# Patient Record
Sex: MALE | Race: WHITE | Employment: OTHER | URBAN - METROPOLITAN AREA
[De-identification: names, ages, dates, MRNs, and addresses within clinical notes are randomized per-mention and may not be internally consistent; named-entity substitution may affect disease eponyms.]

---

## 2020-11-10 ENCOUNTER — HOSPITAL ENCOUNTER (EMERGENCY)
Age: 30
Discharge: HOME | End: 2020-11-10
Payer: COMMERCIAL

## 2020-11-10 VITALS
SYSTOLIC BLOOD PRESSURE: 144 MMHG | RESPIRATION RATE: 20 BRPM | WEIGHT: 260 LBS | TEMPERATURE: 98.1 F | HEART RATE: 67 BPM | DIASTOLIC BLOOD PRESSURE: 98 MMHG | BODY MASS INDEX: 34.46 KG/M2 | OXYGEN SATURATION: 98 % | HEIGHT: 73 IN

## 2020-11-10 LAB
A/G RATIO: 1.7 (ref 1.1–2.2)
ALBUMIN SERPL-MCNC: 4.6 G/DL (ref 3.4–5)
ALP BLD-CCNC: 57 U/L (ref 40–129)
ALT SERPL-CCNC: 122 U/L (ref 10–40)
AMMONIA: 79 UMOL/L (ref 16–60)
AMPHETAMINE SCREEN, URINE: POSITIVE
ANION GAP SERPL CALCULATED.3IONS-SCNC: 6 MMOL/L (ref 3–16)
AST SERPL-CCNC: 61 U/L (ref 15–37)
BARBITURATE SCREEN URINE: ABNORMAL
BASOPHILS ABSOLUTE: 0 K/UL (ref 0–0.2)
BASOPHILS RELATIVE PERCENT: 0.5 %
BENZODIAZEPINE SCREEN, URINE: ABNORMAL
BILIRUB SERPL-MCNC: 0.3 MG/DL (ref 0–1)
BILIRUBIN URINE: NEGATIVE
BLOOD, URINE: NEGATIVE
BUN BLDV-MCNC: 11 MG/DL (ref 7–20)
CALCIUM SERPL-MCNC: 9.6 MG/DL (ref 8.3–10.6)
CANNABINOID SCREEN URINE: POSITIVE
CHLORIDE BLD-SCNC: 101 MMOL/L (ref 99–110)
CLARITY: CLEAR
CO2: 28 MMOL/L (ref 21–32)
COCAINE METABOLITE SCREEN URINE: ABNORMAL
COLOR: YELLOW
CREAT SERPL-MCNC: 0.7 MG/DL (ref 0.9–1.3)
EOSINOPHILS ABSOLUTE: 0.3 K/UL (ref 0–0.6)
EOSINOPHILS RELATIVE PERCENT: 4.2 %
GFR AFRICAN AMERICAN: >60
GFR NON-AFRICAN AMERICAN: >60
GLOBULIN: 2.7 G/DL
GLUCOSE BLD-MCNC: 116 MG/DL (ref 70–99)
GLUCOSE URINE: NEGATIVE MG/DL
HCT VFR BLD CALC: 42.9 % (ref 40.5–52.5)
HEMOGLOBIN: 14.5 G/DL (ref 13.5–17.5)
KETONES, URINE: NEGATIVE MG/DL
LEUKOCYTE ESTERASE, URINE: NEGATIVE
LYMPHOCYTES ABSOLUTE: 2.9 K/UL (ref 1–5.1)
LYMPHOCYTES RELATIVE PERCENT: 38.7 %
Lab: ABNORMAL
MCH RBC QN AUTO: 31.2 PG (ref 26–34)
MCHC RBC AUTO-ENTMCNC: 33.8 G/DL (ref 31–36)
MCV RBC AUTO: 92.2 FL (ref 80–100)
METHADONE SCREEN, URINE: ABNORMAL
MICROSCOPIC EXAMINATION: NORMAL
MONOCYTES ABSOLUTE: 0.5 K/UL (ref 0–1.3)
MONOCYTES RELATIVE PERCENT: 7.1 %
NEUTROPHILS ABSOLUTE: 3.7 K/UL (ref 1.7–7.7)
NEUTROPHILS RELATIVE PERCENT: 49.5 %
NITRITE, URINE: NEGATIVE
OPIATE SCREEN URINE: ABNORMAL
OXYCODONE URINE: ABNORMAL
PDW BLD-RTO: 13.3 % (ref 12.4–15.4)
PH UA: 6
PH UA: 6 (ref 5–8)
PHENCYCLIDINE SCREEN URINE: ABNORMAL
PLATELET # BLD: 203 K/UL (ref 135–450)
PMV BLD AUTO: 9.3 FL (ref 5–10.5)
POTASSIUM REFLEX MAGNESIUM: 3.7 MMOL/L (ref 3.5–5.1)
PROPOXYPHENE SCREEN: ABNORMAL
PROTEIN UA: NEGATIVE MG/DL
RBC # BLD: 4.66 M/UL (ref 4.2–5.9)
SODIUM BLD-SCNC: 135 MMOL/L (ref 136–145)
SPECIFIC GRAVITY UA: 1.01 (ref 1–1.03)
TOTAL PROTEIN: 7.3 G/DL (ref 6.4–8.2)
URINE TYPE: NORMAL
UROBILINOGEN, URINE: 0.2 E.U./DL
WBC # BLD: 7.4 K/UL (ref 4–11)

## 2020-11-10 PROCEDURE — 99284 EMERGENCY DEPT VISIT MOD MDM: CPT

## 2020-11-10 PROCEDURE — 85025 COMPLETE CBC W/AUTO DIFF WBC: CPT

## 2020-11-10 PROCEDURE — 81003 URINALYSIS AUTO W/O SCOPE: CPT

## 2020-11-10 PROCEDURE — 6370000000 HC RX 637 (ALT 250 FOR IP): Performed by: NURSE PRACTITIONER

## 2020-11-10 PROCEDURE — 80307 DRUG TEST PRSMV CHEM ANLYZR: CPT

## 2020-11-10 PROCEDURE — 82140 ASSAY OF AMMONIA: CPT

## 2020-11-10 PROCEDURE — 80053 COMPREHEN METABOLIC PANEL: CPT

## 2020-11-10 RX ORDER — LACTULOSE 10 G/15ML
20 SOLUTION ORAL ONCE
Status: COMPLETED | OUTPATIENT
Start: 2020-11-10 | End: 2020-11-10

## 2020-11-10 RX ORDER — LACTULOSE 10 G/15ML
20 SOLUTION ORAL EVERY EVENING
Qty: 900 ML | Refills: 0 | Status: SHIPPED | OUTPATIENT
Start: 2020-11-10 | End: 2020-12-10

## 2020-11-10 RX ADMIN — RIFAXIMIN 550 MG: 550 TABLET ORAL at 21:29

## 2020-11-10 RX ADMIN — LACTULOSE 20 G: 20 SOLUTION ORAL at 21:29

## 2020-11-11 NOTE — ED NOTES
Discharge instructions were reviewed with the patient and the patient verbalized understanding. Patient IV was removed with no complications. Patient stable and ambulatory upon discharge.        Annemarie Joy RN  11/10/20 1583
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
## 2020-11-13 ENCOUNTER — TELEPHONE (OUTPATIENT)
Dept: GASTROENTEROLOGY | Age: 30
End: 2020-11-13

## 2020-11-13 NOTE — TELEPHONE ENCOUNTER
----- Message from Vidal Bermeo MD sent at 11/11/2020 12:43 PM EST -----  Offer apt.   Referred by ER.  ----- Message -----  From: Automatic Discharge Provider  Sent: 11/10/2020   9:35 PM EST  To: Vidal Bermeo MD

## 2020-11-16 NOTE — TELEPHONE ENCOUNTER
Scheduled 11/23 You can access the FollowMyHealth Patient Portal offered by [facility] by registering at the following website: [email or web address]. By joining [facility]’s FollowMyHealth portal, you will also be able to view your health information using other applications (apps) compatible with our system.

## 2020-11-17 ENCOUNTER — INITIAL CONSULT (OUTPATIENT)
Dept: GASTROENTEROLOGY | Age: 30
End: 2020-11-17
Payer: COMMERCIAL

## 2020-11-17 ENCOUNTER — HOSPITAL ENCOUNTER (OUTPATIENT)
Age: 30
Discharge: HOME | End: 2020-11-17
Payer: COMMERCIAL

## 2020-11-17 VITALS
TEMPERATURE: 97 F | BODY MASS INDEX: 33 KG/M2 | HEIGHT: 73 IN | WEIGHT: 249 LBS | SYSTOLIC BLOOD PRESSURE: 122 MMHG | DIASTOLIC BLOOD PRESSURE: 88 MMHG

## 2020-11-17 PROCEDURE — 84450 TRANSFERASE (AST) (SGOT): CPT

## 2020-11-17 PROCEDURE — 86702 HIV-2 ANTIBODY: CPT

## 2020-11-17 PROCEDURE — G8427 DOCREV CUR MEDS BY ELIG CLIN: HCPCS | Performed by: INTERNAL MEDICINE

## 2020-11-17 PROCEDURE — 87902 NFCT AGT GNTYP ALYS HEP C: CPT

## 2020-11-17 PROCEDURE — 87522 HEPATITIS C REVRS TRNSCRPJ: CPT

## 2020-11-17 PROCEDURE — G8419 CALC BMI OUT NRM PARAM NOF/U: HCPCS | Performed by: INTERNAL MEDICINE

## 2020-11-17 PROCEDURE — 83883 ASSAY NEPHELOMETRY NOT SPEC: CPT

## 2020-11-17 PROCEDURE — 84520 ASSAY OF UREA NITROGEN: CPT

## 2020-11-17 PROCEDURE — 86701 HIV-1ANTIBODY: CPT

## 2020-11-17 PROCEDURE — 4004F PT TOBACCO SCREEN RCVD TLK: CPT | Performed by: INTERNAL MEDICINE

## 2020-11-17 PROCEDURE — 87390 HIV-1 AG IA: CPT

## 2020-11-17 PROCEDURE — 82977 ASSAY OF GGT: CPT

## 2020-11-17 PROCEDURE — 80074 ACUTE HEPATITIS PANEL: CPT

## 2020-11-17 PROCEDURE — 36415 COLL VENOUS BLD VENIPUNCTURE: CPT

## 2020-11-17 PROCEDURE — 99204 OFFICE O/P NEW MOD 45 MIN: CPT | Performed by: INTERNAL MEDICINE

## 2020-11-17 PROCEDURE — 84460 ALANINE AMINO (ALT) (SGPT): CPT

## 2020-11-17 PROCEDURE — G8484 FLU IMMUNIZE NO ADMIN: HCPCS | Performed by: INTERNAL MEDICINE

## 2020-11-17 NOTE — PATIENT INSTRUCTIONS
[address] ,  [address] [facility]  Phone: [phone]   FZT:[phone]  [address] [place] [address],  [facility] [address]  [place], [address] [place] [address]  Phone: [phone]    What is FibroScan® ? Examination with FibroScan®, also called transient elastography, is a technique used to assess liver stiffness (measured in kPa correlated to fibrosis) without invasive investigation. The result is immediate, it shows the conditionof the liver and allows physicians to diagnoseand monitor disease evolution in conjunction with treatment and collateral factors. Exam results help to anticipate various complications, as well as to monitor and assess the damage caused by conditions such as cirrhosis. The FibroScan® examination is painless, quick and easy. During measurement, you feel a slight vibration on the skin at the tip of the probe. What does the FibroScan® examination consist of? You lie on your back, with your right arm raised behind your head. Physician applies a water-based gel to the skin and places the probe with a slight pressure  The examination includes 10 consecutive measurements made at the same location  The result is delivered at the end of the examination, it's a number which can vary from 1.5 to 75 kPa. Your doctor will interpret the result      What does the result mean? Your physician interprets the result according to your history and underlying disease. Who can prescribe the FibroScan® examination? Your physician or hepatologist will indicate the most appropriate time for you to have the examination. What difference does FibroScan® make to me?     Fibroscan® provides immediate results, it's easy and fast (5-10 minutes)  The exam is painless and non-invasive  In case of close follow-up, the examination can be safely repeated

## 2020-11-17 NOTE — PROGRESS NOTES
(This list may include medications prescribed during this encounter as epic can not insert only the list prior to this encounter.)  Current Outpatient Rx   Medication Sig Dispense Refill    lactulose (CHRONULAC) 10 GM/15ML solution Take 30 mLs by mouth every evening 900 mL 0     ALLERGIES   No Known Allergies  IMMUNIZATIONS     There is no immunization history on file for this patient. REVIEW OF SYSTEMS (2-9 systems for level 4, 10 or more for level 5)   See HPI for further details and pertinent postiives. Negative for the following:  Constitutional: Negative for weight change. Negative for appetite change and fatigue. HENT: Negative for nosebleeds, sore throat, mouth sores, and voice change. Respiratory: Negative for cough, choking and chest tightness. Cardiovascular: Negative for chest pain   Gastrointestinal: No abdominal pain, heartburn, bloating, dysphagia, cough, chest pain, globus, regurgitation, diarrhea, constipation, nausea, or vomiting. Positive for hallucinations. Musculoskeletal: Negative for arthralgias. Skin: Negative for pallor. Neurological: Negative for weakness and light-headedness. Hematological: Negative for adenopathy. Does not bruise/bleed easily. Psychiatric/Behavioral: Negative for suicidal ideas. PHYSICAL EXAM   VITAL SIGNS: /88   Temp 97 °F (36.1 °C)   Ht 6' 1\" (1.854 m)   Wt 249 lb (112.9 kg)   BMI 32.85 kg/m²   Wt Readings from Last 3 Encounters:   11/17/20 249 lb (112.9 kg)   11/10/20 260 lb (117.9 kg)     Constitutional: Well developed, Well nourished, No acute distress, Non-toxic appearance. HENT: Normocephalic, Atraumatic, Bilateral external ears normal, Oropharynx moist, No oral exudates, Nose normal.   Eyes: Conjunctiva normal, No discharge. Neck: Normal range of motion, No tenderness, Supple, No stridor. Lymphatic: No cervical, subclavian, or axillary lymphadenopathy.   Cardiovascular: Normal heart rate, Normal rhythm, No murmurs, No rubs, No gallops. Thorax & Lungs: Normal breath sounds, No respiratory distress, No wheezing, No chest tenderness. No gynecomastia. Abdomen/GI: scars consistent with stated surgeries, no hernias, no HSM, soft NTND   Rectal:  Deferred. Skin: Warm, Dry, No erythema, No rash. No bruising. No spider hemangiomas. Back: No tenderness, No CVA tenderness. Lower Extremities: Intact distal pulses, No edema, No tenderness, No cyanosis, No clubbing. Neurologic: Alert & oriented x 3, Normal motor function, Normal sensory function, No focal deficits noted. No asterixis. RADIOLOGY/PROCEDURES       FINAL IMPRESSION     Orders Placed This Encounter   Procedures    US GALLBLADDER RUQ     Standing Status:   Future     Standing Expiration Date:   11/17/2021     Order Specific Question:   Reason for exam:     Answer:   elevated lft    HIV Screen     Standing Status:   Future     Number of Occurrences:   1     Standing Expiration Date:   11/17/2021    Hepatitis C Genotype     Standing Status:   Future     Number of Occurrences:   1     Standing Expiration Date:   11/17/2021    Hepatitis C RNA, quantitative, PCR     Standing Status:   Future     Number of Occurrences:   1     Standing Expiration Date:   12/17/2020    Liver Fibrosis, Chronic Viral Hepatitis     Standing Status:   Future     Number of Occurrences:   1     Standing Expiration Date:   11/17/2021    Hepatitis Panel, Acute     Standing Status:   Future     Number of Occurrences:   1     Standing Expiration Date:   12/17/2020    Misc nursing order (specify)     Standing Status:   Future     Standing Expiration Date:   12/17/2020     Scheduling Instructions:      Schedule Fibroscan test through [facility] endoscopy. Ana Elgin was seen today for new patient. Diagnoses and all orders for this visit:    Hepatitis C virus infection without hepatic coma, unspecified chronicity  -     HIV Screen; Future  -     Hepatitis C Genotype;  Future  -     Hepatitis C RNA, quantitative, PCR; Future  -     Liver Fibrosis, Chronic Viral Hepatitis; Future  -     Hepatitis Panel, Acute; Future  -     US GALLBLADDER RUQ; Future  -     Misc nursing order (specify); Future    Elevated LFTs  -     Hepatitis Panel, Acute; Future  -     US GALLBLADDER RUQ; Future      ORDERED FUTURE/PENDING TESTS       FOLLOWUP   Return 1 month into treatment, for after ordered tests. Hank Webster 11/17/20 2:52 PM EST    CC:  No primary care provider on file.

## 2020-11-18 ENCOUNTER — HOSPITAL ENCOUNTER (OUTPATIENT)
Dept: ULTRASOUND IMAGING | Age: 30
Discharge: HOME | End: 2020-11-18
Payer: COMMERCIAL

## 2020-11-18 ENCOUNTER — HOSPITAL ENCOUNTER (OUTPATIENT)
Dept: ENDOSCOPY | Age: 30
Setting detail: OUTPATIENT SURGERY
Discharge: HOME | End: 2020-11-18
Payer: COMMERCIAL

## 2020-11-18 LAB
HAV IGM SER IA-ACNC: ABNORMAL
HEPATITIS B CORE IGM ANTIBODY: ABNORMAL
HEPATITIS B SURFACE ANTIGEN INTERPRETATION: ABNORMAL
HEPATITIS C ANTIBODY INTERPRETATION: REACTIVE
HIV AG/AB: NORMAL
HIV ANTIGEN: NORMAL
HIV-1 ANTIBODY: NORMAL
HIV-2 AB: NORMAL

## 2020-11-18 PROCEDURE — 76705 ECHO EXAM OF ABDOMEN: CPT

## 2020-11-19 ENCOUNTER — HOSPITAL ENCOUNTER (OUTPATIENT)
Dept: ENDOSCOPY | Age: 30
Setting detail: OUTPATIENT SURGERY
Discharge: HOME | End: 2020-11-19
Payer: COMMERCIAL

## 2020-11-19 LAB
HCV QNT BY NAAT IU/ML: ABNORMAL
HCV QNT BY NAAT LOG IU/ML: 6.46 LOG IU/ML
INTERPRETATION: DETECTED

## 2020-11-19 PROCEDURE — 91200 LIVER ELASTOGRAPHY: CPT

## 2020-11-19 PROCEDURE — 91200 LIVER ELASTOGRAPHY: CPT | Performed by: INTERNAL MEDICINE

## 2020-11-23 LAB
HEPATITIS C GENOTYPE: NORMAL
MISCELLANEOUS LAB TEST ORDER: ABNORMAL

## 2020-12-07 ENCOUNTER — TELEPHONE (OUTPATIENT)
Dept: GASTROENTEROLOGY | Age: 30
End: 2020-12-07

## 2020-12-07 NOTE — TELEPHONE ENCOUNTER
Called pt to let him know insurance approved Wali Yolanda 12/01/2020-02/02/2021. He will be getting a call from [address] [place] [address] about meds.

## 2020-12-08 ENCOUNTER — TELEPHONE (OUTPATIENT)
Dept: GASTROENTEROLOGY | Age: 30
End: 2020-12-08

## 2021-01-07 ENCOUNTER — INITIAL CONSULT (OUTPATIENT)
Dept: GASTROENTEROLOGY | Age: 31
End: 2021-01-07
Payer: COMMERCIAL

## 2021-01-07 ENCOUNTER — HOSPITAL ENCOUNTER (OUTPATIENT)
Age: 31
Discharge: HOME | End: 2021-01-07
Payer: COMMERCIAL

## 2021-01-07 VITALS
TEMPERATURE: 96.9 F | BODY MASS INDEX: 33.53 KG/M2 | WEIGHT: 253 LBS | DIASTOLIC BLOOD PRESSURE: 120 MMHG | HEART RATE: 77 BPM | HEIGHT: 73 IN | SYSTOLIC BLOOD PRESSURE: 179 MMHG

## 2021-01-07 DIAGNOSIS — B19.20 HEPATITIS C VIRUS INFECTION WITHOUT HEPATIC COMA, UNSPECIFIED CHRONICITY: ICD-10-CM

## 2021-01-07 DIAGNOSIS — R79.89 ELEVATED LFTS: Primary | ICD-10-CM

## 2021-01-07 DIAGNOSIS — R79.89 ELEVATED LFTS: ICD-10-CM

## 2021-01-07 LAB
ALBUMIN SERPL-MCNC: 4.5 G/DL (ref 3.4–5)
ALP BLD-CCNC: 67 U/L (ref 40–129)
ALT SERPL-CCNC: 28 U/L (ref 10–40)
AST SERPL-CCNC: 28 U/L (ref 15–37)
BILIRUB SERPL-MCNC: <0.2 MG/DL (ref 0–1)
BILIRUBIN DIRECT: <0.2 MG/DL (ref 0–0.3)
BILIRUBIN, INDIRECT: NORMAL MG/DL (ref 0–1)
TOTAL PROTEIN: 7.4 G/DL (ref 6.4–8.2)

## 2021-01-07 PROCEDURE — G8484 FLU IMMUNIZE NO ADMIN: HCPCS | Performed by: INTERNAL MEDICINE

## 2021-01-07 PROCEDURE — 99213 OFFICE O/P EST LOW 20 MIN: CPT | Performed by: INTERNAL MEDICINE

## 2021-01-07 PROCEDURE — 4004F PT TOBACCO SCREEN RCVD TLK: CPT | Performed by: INTERNAL MEDICINE

## 2021-01-07 PROCEDURE — 36415 COLL VENOUS BLD VENIPUNCTURE: CPT

## 2021-01-07 PROCEDURE — G8417 CALC BMI ABV UP PARAM F/U: HCPCS | Performed by: INTERNAL MEDICINE

## 2021-01-07 PROCEDURE — G8427 DOCREV CUR MEDS BY ELIG CLIN: HCPCS | Performed by: INTERNAL MEDICINE

## 2021-01-07 PROCEDURE — 80076 HEPATIC FUNCTION PANEL: CPT

## 2021-01-07 RX ORDER — LACTULOSE 10 G/15ML
1 SOLUTION ORAL DAILY
COMMUNITY
End: 2022-07-06

## 2021-01-07 RX ORDER — GLECAPREVIR AND PIBRENTASVIR 40; 100 MG/1; MG/1
1 TABLET, FILM COATED ORAL DAILY
COMMUNITY
Start: 2021-01-05 | End: 2022-07-06

## 2021-01-07 NOTE — PROGRESS NOTES
[address] [facility],  [address] [place] [address]  Phone: [phone]   QND:[phone]    CHIEF COMPLAINT     Chief Complaint   Patient presents with    New Patient     elevated liver enzymes       HPI (location/symptom, timing/onset, duration, quality/severity, context, modifying factors, and associated signs/symptoms)     He is a  [2] White [1] 26 y.o. . male seen with his wife who presents with the following GI complaints:    Leonid Falk is her for follow up of chcv. He has been on mavyret for the past month without side effects. Pretreatment transaminases were elevated. Fibroscan showed F0-1 fibrosis. He continues to have hallucinations. Has not seen neurology or psychiatry. Last Encounter Reviewed: 11/17/2020  Leonid Falk  Has a h/o substance abuse. He was recently in the ER 4 days ago for hallucinations of 7 months duration surrounding his wife. He had been clean 4 year and had recently relapsed once. In the ER transaminases were elevated as was ammonia. Transaminases were normal in 2015. CBC and plt count were normal.  No imaging performed without any abdominal complaints. He was told he had hcv years ago but has never pursued treatment. There is a family history of cirrhosis and alcoholism. He denies drinking.              Lab Results   Component Value Date      (H) 11/10/2020     AST 61 (H) 11/10/2020     ALKPHOS 57 11/10/2020     BILITOT 0.3 11/10/2020            Lab Results   Component Value Date     WBC 7.4 11/10/2020     HGB 14.5 11/10/2020     HCT 42.9 11/10/2020     MCV 92.2 11/10/2020      11/10/2020         Last Encounter Reviewed:   Pertinent PMH, FH, SH is reviewed below. Last EGD: none  Last Colonoscopy: none    Review of available records reveals:   Wt Readings from Last 50 Encounters:   01/07/21 253 lb (114.8 kg)   11/17/20 249 lb (112.9 kg)   11/10/20 260 lb (117.9 kg)       No components found for: HGBA1C BP Readings from Last 3 Encounters:   01/07/21 (!) 179/120   11/17/20 122/88   11/10/20 (!) 144/98     Health Maintenance   Topic Date Due    Hepatitis A vaccine (1 of 2 - Risk 2-dose series) 04/22/1989    Varicella vaccine (1 of 2 - 2-dose childhood series) 04/22/1989    Pneumococcal 0-64 years Vaccine (1 of 1 - PPSV23) 04/22/1994    Hepatitis B vaccine (2 of 3 - Risk 3-dose series) 04/19/2001    Flu vaccine (1) 09/01/2020    DTaP/Tdap/Td vaccine (5 - Td) 04/20/2027    HIV screen  Completed    Hib vaccine  Aged Out    Meningococcal (ACWY) vaccine  Aged Out       No components found for: [facility]     PAST MEDICAL HISTORY     Past Medical History:   Diagnosis Date    Hepatitis C 11/17/2020    [facility] abuse ([facility] [address]     Methamphetamine abuse ([facility] [address]      FAMILY HISTORY   No family history on file.   SOCIAL HISTORY     Social History     Socioeconomic History    Marital status:      Spouse name: Not on file    Number of children: Not on file    Years of education: Not on file    Highest education level: Not on file   Occupational History    Not on file   Social Needs    Financial resource strain: Not on file    Food insecurity     Worry: Not on file     Inability: Not on file    Transportation needs     Medical: Not on file     Non-medical: Not on file   Tobacco Use    Smoking status: Current Every Day Smoker     Packs/day: 1.00     Types: Cigarettes    Smokeless tobacco: Current User   Substance and Sexual Activity    Alcohol use: No    Drug use: Yes     Types: IV, Methamphetamines     Comment: meth last used 11/09/2020    Sexual activity: Not on file   Lifestyle    Physical activity     Days per week: Not on file     Minutes per session: Not on file    Stress: Not on file   Relationships    Social connections     Talks on phone: Not on file     Gets together: Not on file     Attends Anglican service: Not on file     Active member of club or organization: Not on file VITAL SIGNS: BP (!) 179/120 (Site: Left Wrist, Position: Sitting, Cuff Size: Medium Adult)   Pulse 77   Temp 96.9 °F (36.1 °C) (Temporal)   Ht 6' 1\" (1.854 m)   Wt 253 lb (114.8 kg)   BMI 33.38 kg/m²   Wt Readings from Last 3 Encounters:   01/07/21 253 lb (114.8 kg)   11/17/20 249 lb (112.9 kg)   11/10/20 260 lb (117.9 kg)     Constitutional: Well developed, Well nourished, No acute distress, Non-toxic appearance. HENT: Normocephalic, Atraumatic, Bilateral external ears normal, Oropharynx moist, No oral exudates, Nose normal.   Eyes: Conjunctiva normal, No discharge. Neck: Normal range of motion, No tenderness, Supple, No stridor. Lymphatic: No cervical, subclavian, or axillary lymphadenopathy. Cardiovascular: Normal heart rate, Normal rhythm, No murmurs, No rubs, No gallops. Thorax & Lungs: Normal breath sounds, No respiratory distress, No wheezing, No chest tenderness. No gynecomastia. Abdomen/GI: scars consistent with stated surgeries, no hernias, no HSM, soft NTND   Rectal:  Deferred. Skin: Warm, Dry, No erythema, No rash. No bruising. No spider hemangiomas. Back: No tenderness, No CVA tenderness. Lower Extremities: Intact distal pulses, No edema, No tenderness, No cyanosis, No clubbing. Neurologic: Alert & oriented x 3, Normal motor function, Normal sensory function, No focal deficits noted. No asterixis. RADIOLOGY/PROCEDURES         FINAL IMPRESSION     Orders Placed This Encounter   Procedures    Hepatic Function Panel     Standing Status:   Future     Number of Occurrences:   1     Standing Expiration Date:   2/7/2021     Flor Vidales was seen today for new patient. Diagnoses and all orders for this visit:    Elevated LFTs  -     Hepatic Function Panel; Future    Hepatitis C virus infection without hepatic coma, unspecified chronicity  -     Hepatic Function Panel;  Future Will order viral load at completion of treatment and 3 months later for SVR. Recommended calling pcp for referral to psychiatry and neurology for hallucinations. Does not need to see us back unless hcv treatment is unsuccessful. ORDERED FUTURE/PENDING TESTS     Lab Frequency Next Occurrence       FOLLOWUP   Return for after ordered tests. Jean Carlos Herrera 1/7/21 3:11 PM EST    CC:  No primary care provider on file.

## 2021-03-04 ENCOUNTER — TELEPHONE (OUTPATIENT)
Dept: GASTROENTEROLOGY | Age: 31
End: 2021-03-04

## 2021-03-04 DIAGNOSIS — B19.20 HEPATITIS C VIRUS INFECTION WITHOUT HEPATIC COMA, UNSPECIFIED CHRONICITY: Primary | ICD-10-CM

## 2021-03-04 NOTE — TELEPHONE ENCOUNTER
Pt was seeing Dr Jaleesa Arellano. Pt has finished his mavyret. Is there labs, us, or anything he needs before appointment? Pt scheduled 3/16/21.  Please advise

## 2021-06-02 ENCOUNTER — HOSPITAL ENCOUNTER (EMERGENCY)
Age: 31
Discharge: HOME | End: 2021-06-02
Payer: COMMERCIAL

## 2021-06-02 ENCOUNTER — HOSPITAL ENCOUNTER (OUTPATIENT)
Age: 31
Discharge: HOME | End: 2021-06-02
Payer: COMMERCIAL

## 2021-06-02 VITALS
OXYGEN SATURATION: 98 % | TEMPERATURE: 97.5 F | HEIGHT: 73 IN | RESPIRATION RATE: 16 BRPM | DIASTOLIC BLOOD PRESSURE: 90 MMHG | SYSTOLIC BLOOD PRESSURE: 156 MMHG | HEART RATE: 90 BPM | BODY MASS INDEX: 33.13 KG/M2 | WEIGHT: 250 LBS

## 2021-06-02 DIAGNOSIS — R03.0 ELEVATED BLOOD PRESSURE READING: ICD-10-CM

## 2021-06-02 DIAGNOSIS — H92.02 ACUTE OTALGIA, LEFT: Primary | ICD-10-CM

## 2021-06-02 DIAGNOSIS — K08.89 ODONTALGIA: ICD-10-CM

## 2021-06-02 DIAGNOSIS — N30.01 ACUTE CYSTITIS WITH HEMATURIA: ICD-10-CM

## 2021-06-02 DIAGNOSIS — H65.192 ACUTE MEE (MIDDLE EAR EFFUSION), LEFT: ICD-10-CM

## 2021-06-02 DIAGNOSIS — B19.20 HEPATITIS C VIRUS INFECTION WITHOUT HEPATIC COMA, UNSPECIFIED CHRONICITY: ICD-10-CM

## 2021-06-02 LAB
BACTERIA: ABNORMAL /HPF
BILIRUBIN URINE: NEGATIVE
BLOOD, URINE: ABNORMAL
CLARITY: CLEAR
COLOR: YELLOW
EPITHELIAL CELLS, UA: ABNORMAL /HPF (ref 0–5)
GLUCOSE URINE: NEGATIVE MG/DL
KETONES, URINE: NEGATIVE MG/DL
LEUKOCYTE ESTERASE, URINE: ABNORMAL
MICROSCOPIC EXAMINATION: YES
NITRITE, URINE: NEGATIVE
PH UA: 7 (ref 5–8)
PROTEIN UA: NEGATIVE MG/DL
RBC UA: ABNORMAL /HPF (ref 0–4)
SPECIFIC GRAVITY UA: 1.02 (ref 1–1.03)
URINE REFLEX TO CULTURE: ABNORMAL
URINE TYPE: ABNORMAL
UROBILINOGEN, URINE: 0.2 E.U./DL
WBC UA: ABNORMAL /HPF (ref 0–5)

## 2021-06-02 PROCEDURE — 6370000000 HC RX 637 (ALT 250 FOR IP): Performed by: PHYSICIAN ASSISTANT

## 2021-06-02 PROCEDURE — 6360000002 HC RX W HCPCS: Performed by: PHYSICIAN ASSISTANT

## 2021-06-02 PROCEDURE — 81001 URINALYSIS AUTO W/SCOPE: CPT

## 2021-06-02 PROCEDURE — 87522 HEPATITIS C REVRS TRNSCRPJ: CPT

## 2021-06-02 PROCEDURE — 87086 URINE CULTURE/COLONY COUNT: CPT

## 2021-06-02 PROCEDURE — 87491 CHLMYD TRACH DNA AMP PROBE: CPT

## 2021-06-02 PROCEDURE — 87591 N.GONORRHOEAE DNA AMP PROB: CPT

## 2021-06-02 PROCEDURE — 2500000003 HC RX 250 WO HCPCS: Performed by: PHYSICIAN ASSISTANT

## 2021-06-02 PROCEDURE — 99285 EMERGENCY DEPT VISIT HI MDM: CPT

## 2021-06-02 PROCEDURE — 87186 SC STD MICRODIL/AGAR DIL: CPT

## 2021-06-02 PROCEDURE — 87088 URINE BACTERIA CULTURE: CPT

## 2021-06-02 PROCEDURE — 96372 THER/PROPH/DIAG INJ SC/IM: CPT

## 2021-06-02 RX ORDER — CEFUROXIME AXETIL 500 MG/1
500 TABLET ORAL 2 TIMES DAILY
Qty: 14 TABLET | Refills: 0 | Status: SHIPPED | OUTPATIENT
Start: 2021-06-02 | End: 2021-06-09

## 2021-06-02 RX ORDER — AZITHROMYCIN 250 MG/1
1000 TABLET, FILM COATED ORAL ONCE
Status: COMPLETED | OUTPATIENT
Start: 2021-06-02 | End: 2021-06-02

## 2021-06-02 RX ADMIN — AZITHROMYCIN 1000 MG: 250 TABLET, FILM COATED ORAL at 22:23

## 2021-06-02 RX ADMIN — CEFTRIAXONE SODIUM 1000 MG: 1 INJECTION, POWDER, FOR SOLUTION INTRAMUSCULAR; INTRAVENOUS at 22:24

## 2021-06-02 ASSESSMENT — PAIN SCALES - GENERAL: PAINLEVEL_OUTOF10: 4

## 2021-06-02 ASSESSMENT — PAIN DESCRIPTION - LOCATION: LOCATION: EAR;BACK

## 2021-06-03 LAB
C. TRACHOMATIS DNA ,URINE: NEGATIVE
N. GONORRHOEAE DNA, URINE: NEGATIVE

## 2021-06-03 ASSESSMENT — ENCOUNTER SYMPTOMS
VOMITING: 0
SHORTNESS OF BREATH: 0
COUGH: 0
FACIAL SWELLING: 0
ABDOMINAL PAIN: 0
BACK PAIN: 1
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
## 2021-06-03 NOTE — ED NOTES
..Patient discharged to home, alert, oriented, skin warm, dry and pink. Denies needs and or questions.  Will follow up as directed, patient encouraged to return for worsening or new symptoms or other concerns       Lisa Copeland RN  06/02/21 0001

## 2021-06-04 LAB
ORGANISM: ABNORMAL
URINE CULTURE, ROUTINE: ABNORMAL

## 2021-06-05 LAB
HCV QNT BY NAAT IU/ML: NOT DETECTED IU/ML
HCV QNT BY NAAT LOG IU/ML: NOT DETECTED LOG IU/ML
INTERPRETATION: NOT DETECTED

## 2022-07-06 ENCOUNTER — HOSPITAL ENCOUNTER (EMERGENCY)
Age: 32
Discharge: HOME | End: 2022-07-07
Payer: COMMERCIAL

## 2022-07-06 VITALS
BODY MASS INDEX: 33.13 KG/M2 | WEIGHT: 250 LBS | HEIGHT: 73 IN | RESPIRATION RATE: 16 BRPM | SYSTOLIC BLOOD PRESSURE: 156 MMHG | HEART RATE: 89 BPM | TEMPERATURE: 97.6 F | OXYGEN SATURATION: 94 % | DIASTOLIC BLOOD PRESSURE: 112 MMHG

## 2022-07-06 DIAGNOSIS — N34.2 URETHRITIS: Primary | ICD-10-CM

## 2022-07-06 LAB
BILIRUBIN URINE: NEGATIVE
BLOOD, URINE: NEGATIVE
CLARITY: CLEAR
COLOR: YELLOW
GLUCOSE URINE: NEGATIVE MG/DL
KETONES, URINE: NEGATIVE MG/DL
LEUKOCYTE ESTERASE, URINE: NEGATIVE
MICROSCOPIC EXAMINATION: NORMAL
NITRITE, URINE: NEGATIVE
PH UA: 6.5 (ref 5–8)
PROTEIN UA: NEGATIVE MG/DL
SPECIFIC GRAVITY UA: 1.02 (ref 1–1.03)
URINE TYPE: NORMAL
UROBILINOGEN, URINE: 0.2 E.U./DL

## 2022-07-06 PROCEDURE — 87491 CHLMYD TRACH DNA AMP PROBE: CPT

## 2022-07-06 PROCEDURE — 81003 URINALYSIS AUTO W/O SCOPE: CPT

## 2022-07-06 PROCEDURE — 87591 N.GONORRHOEAE DNA AMP PROB: CPT

## 2022-07-06 PROCEDURE — 96372 THER/PROPH/DIAG INJ SC/IM: CPT

## 2022-07-06 PROCEDURE — 99284 EMERGENCY DEPT VISIT MOD MDM: CPT

## 2022-07-06 RX ORDER — DOXYCYCLINE HYCLATE 100 MG
100 TABLET ORAL ONCE
Status: COMPLETED | OUTPATIENT
Start: 2022-07-07 | End: 2022-07-07

## 2022-07-06 ASSESSMENT — PAIN - FUNCTIONAL ASSESSMENT: PAIN_FUNCTIONAL_ASSESSMENT: NONE - DENIES PAIN

## 2022-07-07 PROCEDURE — 6370000000 HC RX 637 (ALT 250 FOR IP): Performed by: NURSE PRACTITIONER

## 2022-07-07 PROCEDURE — 6360000002 HC RX W HCPCS: Performed by: NURSE PRACTITIONER

## 2022-07-07 PROCEDURE — 2500000003 HC RX 250 WO HCPCS: Performed by: NURSE PRACTITIONER

## 2022-07-07 RX ORDER — DOXYCYCLINE HYCLATE 100 MG
100 TABLET ORAL 2 TIMES DAILY
Qty: 14 TABLET | Refills: 0 | Status: SHIPPED | OUTPATIENT
Start: 2022-07-07 | End: 2022-07-14

## 2022-07-07 RX ADMIN — DOXYCYCLINE HYCLATE 100 MG: 100 TABLET, COATED ORAL at 00:08

## 2022-07-07 RX ADMIN — LIDOCAINE HYDROCHLORIDE 500 MG: 10 INJECTION, SOLUTION EPIDURAL; INFILTRATION; INTRACAUDAL; PERINEURAL at 00:08

## 2022-07-08 LAB
C. TRACHOMATIS DNA ,URINE: POSITIVE
N. GONORRHOEAE DNA, URINE: NEGATIVE
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
## 2022-12-13 ENCOUNTER — APPOINTMENT (OUTPATIENT)
Dept: GENERAL RADIOLOGY | Age: 32
End: 2022-12-13
Payer: COMMERCIAL

## 2022-12-13 ENCOUNTER — HOSPITAL ENCOUNTER (EMERGENCY)
Age: 32
Discharge: HOME | End: 2022-12-13
Payer: COMMERCIAL

## 2022-12-13 VITALS
DIASTOLIC BLOOD PRESSURE: 88 MMHG | HEART RATE: 80 BPM | SYSTOLIC BLOOD PRESSURE: 160 MMHG | TEMPERATURE: 98.3 F | RESPIRATION RATE: 20 BRPM | OXYGEN SATURATION: 99 %

## 2022-12-13 DIAGNOSIS — S80.811A ABRASION OF RIGHT LOWER EXTREMITY, INITIAL ENCOUNTER: ICD-10-CM
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
## 2022-12-13 DIAGNOSIS — S39.012A LUMBAR STRAIN, INITIAL ENCOUNTER: ICD-10-CM

## 2022-12-13 DIAGNOSIS — M25.522 LEFT ELBOW PAIN: ICD-10-CM

## 2022-12-13 PROCEDURE — 6370000000 HC RX 637 (ALT 250 FOR IP): Performed by: PHYSICIAN ASSISTANT

## 2022-12-13 PROCEDURE — 73080 X-RAY EXAM OF ELBOW: CPT

## 2022-12-13 PROCEDURE — 99284 EMERGENCY DEPT VISIT MOD MDM: CPT

## 2022-12-13 PROCEDURE — 6360000002 HC RX W HCPCS: Performed by: PHYSICIAN ASSISTANT

## 2022-12-13 PROCEDURE — 72100 X-RAY EXAM L-S SPINE 2/3 VWS: CPT

## 2022-12-13 PROCEDURE — 90471 IMMUNIZATION ADMIN: CPT | Performed by: PHYSICIAN ASSISTANT

## 2022-12-13 PROCEDURE — 90715 TDAP VACCINE 7 YRS/> IM: CPT | Performed by: PHYSICIAN ASSISTANT

## 2022-12-13 PROCEDURE — 73590 X-RAY EXAM OF LOWER LEG: CPT

## 2022-12-13 RX ORDER — KETOROLAC TROMETHAMINE 10 MG/1
10 TABLET, FILM COATED ORAL EVERY 6 HOURS PRN
Qty: 20 TABLET | Refills: 0 | Status: SHIPPED | OUTPATIENT
Start: 2022-12-13 | End: 2023-12-13

## 2022-12-13 RX ORDER — METHOCARBAMOL 750 MG/1
750 TABLET, FILM COATED ORAL ONCE
Status: COMPLETED | OUTPATIENT
Start: 2022-12-13 | End: 2022-12-13

## 2022-12-13 RX ORDER — METHOCARBAMOL 750 MG/1
750 TABLET, FILM COATED ORAL 4 TIMES DAILY
Qty: 40 TABLET | Refills: 0 | Status: SHIPPED | OUTPATIENT
Start: 2022-12-13 | End: 2022-12-23

## 2022-12-13 RX ORDER — KETOROLAC TROMETHAMINE 10 MG/1
10 TABLET, FILM COATED ORAL ONCE
Status: COMPLETED | OUTPATIENT
Start: 2022-12-13 | End: 2022-12-13

## 2022-12-13 RX ADMIN — KETOROLAC TROMETHAMINE 10 MG: 10 TABLET, FILM COATED ORAL at 18:47

## 2022-12-13 RX ADMIN — METHOCARBAMOL 750 MG: 750 TABLET ORAL at 18:47

## 2022-12-13 RX ADMIN — TETANUS TOXOID, REDUCED DIPHTHERIA TOXOID AND ACELLULAR PERTUSSIS VACCINE, ADSORBED 0.5 ML: 5; 2.5; 8; 8; 2.5 SUSPENSION INTRAMUSCULAR at 18:47

## 2022-12-13 ASSESSMENT — PAIN DESCRIPTION - DESCRIPTORS: DESCRIPTORS: ACHING

## 2022-12-13 ASSESSMENT — PAIN DESCRIPTION - ORIENTATION: ORIENTATION: RIGHT

## 2022-12-13 ASSESSMENT — PAIN SCALES - GENERAL
PAINLEVEL_OUTOF10: 9
PAINLEVEL_OUTOF10: 7

## 2022-12-13 ASSESSMENT — PAIN DESCRIPTION - LOCATION: LOCATION: LEG

## 2022-12-13 NOTE — ED PROVIDER NOTES
[facility] Emergency Department    CHIEF COMPLAINT  Wound Check      SHARED SERVICE VISIT  Evaluated by SJ. My supervising physician was available for consultation. HISTORY OF PRESENT ILLNESS  Gissel Murillo is a 27 y.o. male who presents to the ED complaining of several complaints status post fall from ladder. Patient states that he was up approximately 10 feet on a ladder which began to fall. States that he jumped off to the left landing on left side and elbow. Believes hit right shin on ladder. He denies hitting his head or losing consciousness. No complaints of neck pain. Does have some midline low back pain worse on right. Does not radiate otherwise. Patient denies any numbness, tingling, weakness of extremities. Abrasion to right shin which is tender. Also having some left elbow pain right-hand-dominant. Unaware of last tetanus update. Denies chest pain shortness of breath. Has noted no urinary symptoms. No other complaints, modifying factors or associated symptoms. Nursing notes reviewed. Past Medical History:   Diagnosis Date    Hepatitis C 11/17/2020    Heroin abuse ([facility] [address]     Methamphetamine abuse ([facility] [address]      No past surgical history on file. No family history on file.   Social History     Socioeconomic History    Marital status:      Spouse name: Not on file    Number of children: Not on file    Years of education: Not on file    Highest education level: Not on file   Occupational History    Not on file   Tobacco Use    Smoking status: Former     Packs/day: 1.00     Types: Cigarettes    Smokeless tobacco: Current     Types: Chew   Substance and Sexual Activity    Alcohol use: No    Drug use: Not Currently     Types: IV, Methamphetamines (Crystal Meth)     Comment: meth last used 11/09/2020    Sexual activity: Not on file   Other Topics Concern    Not on file   Social History Narrative    Not on file     Social Determinants of Health Financial Resource Strain: Not on file   Food Insecurity: Not on file   Transportation Needs: Not on file   Physical Activity: Not on file   Stress: Not on file   Social Connections: Not on file   Intimate Partner Violence: Not on file   Housing Stability: Not on file     Current Facility-Administered Medications   Medication Dose Route Frequency Provider Last Rate Last Admin    ketorolac (TORADOL) tablet 10 mg  10 mg Oral Once [facility] Blood, [place]        methocarbamol (ROBAXIN) tablet 750 mg  750 mg Oral Once [place], [place]        tetanus-diphth-acell pertussis (BOOSTRIX) injection 0.5 mL  0.5 mL IntraMUSCular Once [facility], [place]         No current outpatient medications on file. No Known Allergies    REVIEW OF SYSTEMS  10 systems reviewed, pertinent positives per HPI otherwise noted to be negative    PHYSICAL EXAM  BP (!) 165/106   Pulse 83   Temp 98.3 °F (36.8 °C) (Oral)   Resp 19   SpO2 98%   GENERAL APPEARANCE: Awake and alert. Cooperative. No acute distress. HEAD: Normocephalic. Atraumatic. No hematomas, lesions, lacerations or abrasions. Negative for gibson signs or raccoon's eyes. EYES: PERRL. EOM's grossly intact. ENT: Mucous membranes are moist.   NECK: Supple. No midline bony tenderness. No crepitus, deformity, or step-off noted. No swelling, bruising, or color change. HEART: RRR. No murmurs. No chest wall tenderness. LUNGS: Respirations unlabored. CTAB. Good air exchange. Speaking comfortably in full sentences. ABDOMEN: Soft. Non-distended. Non-tender. No guarding or rebound. BACK: On exam of thoracic and lumbar spine, there is no swelling, bruising, or color change noted. There is mild lumbar midline bony tenderness, without crepitus, deformity, or step off. Patient exhibits tenderness of lumbar paraspinal musculature to right of midline. There is mild point tenderness over the right SI Joint. EXTREMITIES: No peripheral edema.   Patient with mild soft tissue swelling noted to medial epicondyle of the left elbow. No obvious deformity or step-off. No pain with pronation and supination of forearm. No evidence for dislocations or subluxation. No appreciable joint effusion. Radial pulses +2 and cap refill less than 5 seconds. Patient has some tenderness of the anterior right shin with mild abrasion with eschar in place. No significant redness or swelling to suggest infectious process at this time. Bony tenderness without deformity or step-off. Compartment soft without crepitus. Pedal pulses +2 and cap refill less than 5 seconds. Moves all extremities equally. All extremities neurovascularly intact. Biceps and patellar DTRs +2 bilaterally. SKIN: Warm and dry. No acute rashes. NEUROLOGICAL: Alert and oriented. CN's 2-12 intact. No gross facial drooping. Strength 5/5, sensation intact. HSNE spine intact. PSYCHIATRIC: Normal mood and affect. RADIOLOGY  XR LUMBAR SPINE (2-3 VIEWS)    Result Date: 12/13/2022  EXAMINATION: 2 XRAY VIEWS OF THE LUMBAR SPINE 12/13/2022 6:24 pm COMPARISON: 11/15/2012 HISTORY: ORDERING SYSTEM PROVIDED HISTORY: fall TECHNOLOGIST PROVIDED HISTORY: Reason for exam:->fall Reason for Exam: fall FINDINGS: 5 non-rib-bearing lumbar type vertebral bodies are present. There is no acute fracture or suspect osseous lesion. Vertebral body heights are maintained. Alignment is normal.  Disc space heights are maintained. No significant facet or sacroiliac arthropathy is evident. Soft tissues are unremarkable. No acute osseous abnormality of the lumbar spine. XR ELBOW LEFT (MIN 3 VIEWS)    Result Date: 12/13/2022  EXAMINATION: THREE XRAY VIEWS OF THE LEFT ELBOW 12/13/2022 6:24 pm COMPARISON: None. HISTORY: ORDERING SYSTEM PROVIDED HISTORY: fall TECHNOLOGIST PROVIDED HISTORY: Reason for exam:->fall Reason for Exam: fall FINDINGS: There is no elbow effusion. There is no acute fracture or dislocation. Alignment is normal.     No acute abnormality. XR TIBIA FIBULA RIGHT (2 VIEWS)    Result Date: 12/13/2022  EXAMINATION: 2 X-RAY VIEWS OF THE RIGHT TIBIA AND FIBULA 12/13/2022 6:24 pm COMPARISON: None. HISTORY: ORDERING SYSTEM PROVIDED HISTORY: fall TECHNOLOGIST PROVIDED HISTORY: Reason for exam:->fall Reason for Exam: fall FINDINGS: No acute fracture is seen. No evidence of dislocation. No acute bony abnormality is seen in the right tibia or fibula. ED COURSE  Patient received Toradol and Robaxin for pain, with good relief. Triage vitals stable. X-ray imaging of the left elbow was negative for acute osseous injury or dislocation. No appreciable joint effusion. Right tip/fib plain films also unremarkable for osseous injuries. Lumbar spine plain films with no acute compression fractures noted. At this time patient will be discharged with course of muscle relaxants and anti-inflammatories. Have discussed return precautions as well as recommendations for follow-up otherwise. Patient in agreement and comfortable at discharge. A discussion was had with Mr. Rachelle Dakin regarding injury status post fall from ladder, ED findings and recommendations for follow-up. Risk management discussed and shared decision making had with patient and/or surrogate. All questions were answered. Patient will follow up with PCP in 2 to 3 days as needed for further evaluation/treatment. All questions answered. Patient will return to ED for new/worsening symptoms. Patient was sent home with a prescription for Robaxin and Toradol. MDM  I estimate there is LOW risk for COMPARTMENT SYNDROME, DEEP VENOUS THROMBOSIS, SEPTIC ARTHRITIS, TENDON OR NEUROVASCULAR INJURY,ABDOMINAL AORTIC ANEURYSM, CAUDA EQUINA SYNDROME, EPIDURAL MASS LESION, SPINAL STENOSIS, OR HERNIATED DISK CAUSING SEVERE STENOSIS, thus I consider the discharge disposition reasonable.  Michael Fields and I have discussed the diagnosis and risks, and we agree with discharging home to follow-up with their primary doctor. We also discussed returning to the Emergency Department immediately if new or worsening symptoms occur. We have discussed the symptoms which are most concerning (e.g., saddle anesthesia, urinary or bowel incontinence or retention, changing or worsening pain) that necessitate immediate return. Final Impression  1. Fall from ladder, initial encounter    2. Abrasion of right lower extremity, initial encounter    3. Lumbar strain, initial encounter    4. Left elbow pain      Blood pressure (!) 160/88, pulse 80, temperature 98.3 °F (36.8 °C), temperature source Oral, resp. rate 20, SpO2 99 %. No results found for this visit on 12/13/22. DISPOSITION  Patient was discharged to home in good condition.          Hawk Almeidama  12/13/22 1927

## 2022-12-13 NOTE — ED TRIAGE NOTES
Patient presents to ED, laceration noted to right shin, states he fell off a ladder on Sunday and scraped his leg on a ladder, unknown last TDAP. Also complains of back pain that \"feels like a sharp pain going down my leg\".

## 2022-12-14 NOTE — ED NOTES
Discharge instructions explained by ED provider. Patient verbalized understanding and denies any other concerns or complaints at this time. Patient vital signs stable and no acute signs or symptoms of distress noted at discharge. Patient deemed clinically stable. Patient d/c home.      Keira Green RN  12/13/22 7517

## 2023-02-10 ENCOUNTER — HOSPITAL ENCOUNTER (OUTPATIENT)
Dept: GENERAL RADIOLOGY | Age: 33
Discharge: HOME | End: 2023-02-10
Payer: COMMERCIAL

## 2023-02-10 ENCOUNTER — HOSPITAL ENCOUNTER (OUTPATIENT)
Age: 33
Discharge: HOME | End: 2023-02-10
Payer: COMMERCIAL

## 2023-02-10 DIAGNOSIS — R76.12 REACTION TO QUANTIFERON-TB TEST (QFT) WITHOUT ACTIVE TUBERCULOSIS: ICD-10-CM

## 2023-02-10 PROCEDURE — 71046 X-RAY EXAM CHEST 2 VIEWS: CPT

## 2023-05-15 ENCOUNTER — HOSPITAL ENCOUNTER (EMERGENCY)
Age: 33
Discharge: HOME | End: 2023-05-15
Payer: COMMERCIAL

## 2023-05-15 VITALS
HEART RATE: 117 BPM | HEIGHT: 73 IN | BODY MASS INDEX: 35.78 KG/M2 | TEMPERATURE: 97 F | OXYGEN SATURATION: 98 % | DIASTOLIC BLOOD PRESSURE: 112 MMHG | SYSTOLIC BLOOD PRESSURE: 156 MMHG | RESPIRATION RATE: 18 BRPM | WEIGHT: 270 LBS

## 2023-05-15 DIAGNOSIS — G56.31 SATURDAY NIGHT PALSY, RIGHT: Primary | ICD-10-CM

## 2023-05-15 PROCEDURE — 6370000000 HC RX 637 (ALT 250 FOR IP): Performed by: PHYSICIAN ASSISTANT

## 2023-05-15 PROCEDURE — 99283 EMERGENCY DEPT VISIT LOW MDM: CPT

## 2023-05-15 RX ORDER — PREDNISONE 10 MG/1
TABLET ORAL
Qty: 18 TABLET | Refills: 0 | Status: SHIPPED | OUTPATIENT
Start: 2023-05-15 | End: 2023-05-25

## 2023-05-15 RX ORDER — PREDNISONE 20 MG/1
40 TABLET ORAL ONCE
Status: COMPLETED | OUTPATIENT
Start: 2023-05-15 | End: 2023-05-15

## 2023-05-15 RX ADMIN — PREDNISONE 40 MG: 20 TABLET ORAL at 21:12

## 2023-05-15 ASSESSMENT — PAIN SCALES - GENERAL: PAINLEVEL_OUTOF10: 0

## 2023-05-15 ASSESSMENT — PAIN - FUNCTIONAL ASSESSMENT: PAIN_FUNCTIONAL_ASSESSMENT: 0-10

## 2023-05-15 ASSESSMENT — LIFESTYLE VARIABLES
HOW OFTEN DO YOU HAVE A DRINK CONTAINING ALCOHOL: MONTHLY OR LESS
HOW MANY STANDARD DRINKS CONTAINING ALCOHOL DO YOU HAVE ON A TYPICAL DAY: 1 OR 2

## 2023-05-16 NOTE — ED PROVIDER NOTES
Psychiatric:         Mood and Affect: Mood normal.         Behavior: Behavior normal.         Thought Content: Thought content normal.         Judgment: Judgment normal.         DIAGNOSTIC RESULTS   LABS:    Labs Reviewed - No data to display    When ordered only abnormal lab results are displayed. All other labs were within normal range or not returned as of this dictation. EKG: When ordered, EKG's are interpreted by the Emergency Department Physician in the absence of a cardiologist.  Please see their note for interpretation of EKG. RADIOLOGY:   Non-plain film images such as CT, Ultrasound and MRI are read by the radiologist. Plain radiographic images are visualized and preliminarily interpreted by the ED Provider with the below findings:      Interpretation per the Radiologist below, if available at the time of this note:    No orders to display     No results found. No results found. PROCEDURES   Unless otherwise noted below, none     Procedures    CRITICAL CARE TIME (.cctime)       PAST MEDICAL HISTORY      has a past medical history of Hepatitis C (11/17/2020), Heroin abuse ([facility] [address] and Methamphetamine abuse ([facility] [address] EMERGENCY DEPARTMENT COURSE and DIFFERENTIAL DIAGNOSIS/MDM:   Vitals:    Vitals:    05/15/23 2025 05/15/23 2026 05/15/23 2122   BP:  (!) 156/112    Pulse:  (!) 120 (!) 117   Resp:  18    Temp:  97 °F (36.1 °C)    SpO2:  98%    Weight: 270 lb (122.5 kg)     Height: 6' 1\" (1.854 m)         Patient was given the following medications:  Medications   predniSONE (DELTASONE) tablet 40 mg (40 mg Oral Given 5/15/23 2112)             Is this patient to be included in the SEP-1 Core Measure due to severe sepsis or septic shock? No   Exclusion criteria - the patient is NOT to be included for SEP-1 Core Measure due to: Infection is not suspected    Chronic Conditions affecting care: Current abuse, methamphetamine abuse with recent use of methamphetamine several hours before coming to ED.

## 2023-05-16 NOTE — ED NOTES
2127  As per provider order, a Volare OCL splint was applied to the right wrist. Measurements were taken, and a stocking was applied. This was wrapped with padding, and the OCL splint was cut, applied with a roll of padding in the palm for additional support. This was secured with an Ace wrap. Patient had good movement, sensation, and capillary refill in the fingertips of the right hand before and after splint application. Need for follow up with an orthopedic specialist, as well as care of the splint, was explained to the patient, whom verbalized understanding.      Monica Wilkinson  05/15/23 7007

## 2023-05-16 NOTE — DISCHARGE INSTRUCTIONS
You have a radial nerve palsy because you fell asleep intoxicated. Splint applied. Medication prescribed. Take as prescribed. Recommend you follow-up with orthopedic specialist contact office tomorrow for an appointment Thursday or Friday of this week. You may remove the splint for bathing and then reapply. You do have tachycardia 120. Previous heart rates were less than 100. You did acknowledge using amphetamines today. This would increase your heart rate. You did not indicate chest pain or shortness of breath.

## 2023-10-23 ENCOUNTER — HOSPITAL ENCOUNTER (INPATIENT)
Age: 33
LOS: 3 days | Discharge: HOME | DRG: 751 | End: 2023-10-26
Attending: STUDENT IN AN ORGANIZED HEALTH CARE EDUCATION/TRAINING PROGRAM | Admitting: PSYCHIATRY & NEUROLOGY
Payer: COMMERCIAL

## 2023-10-23 DIAGNOSIS — F29 PSYCHOSIS, UNSPECIFIED PSYCHOSIS TYPE (HCC): Primary | ICD-10-CM

## 2023-10-23 LAB
AMPHETAMINES UR QL SCN>1000 NG/ML: POSITIVE
ANION GAP SERPL CALCULATED.3IONS-SCNC: 13 MMOL/L (ref 3–16)
APAP SERPL-MCNC: <5 UG/ML (ref 10–30)
BARBITURATES UR QL SCN>200 NG/ML: ABNORMAL
BASOPHILS # BLD: 0 K/UL (ref 0–0.2)
BASOPHILS NFR BLD: 0.3 %
BENZODIAZ UR QL SCN>200 NG/ML: ABNORMAL
BILIRUB UR QL STRIP.AUTO: NEGATIVE
BUN SERPL-MCNC: 13 MG/DL (ref 7–20)
CALCIUM SERPL-MCNC: 10.8 MG/DL (ref 8.3–10.6)
CANNABINOIDS UR QL SCN>50 NG/ML: POSITIVE
CHLORIDE SERPL-SCNC: 101 MMOL/L (ref 99–110)
CLARITY UR: CLEAR
CO2 SERPL-SCNC: 26 MMOL/L (ref 21–32)
COCAINE UR QL SCN: POSITIVE
COLOR UR: YELLOW
CREAT SERPL-MCNC: 1 MG/DL (ref 0.9–1.3)
DEPRECATED RDW RBC AUTO: 13.8 % (ref 12.4–15.4)
DRUG SCREEN COMMENT UR-IMP: ABNORMAL
EKG ATRIAL RATE: 89 BPM
EKG DIAGNOSIS: NORMAL
EKG P AXIS: 69 DEGREES
EKG P-R INTERVAL: 122 MS
EKG Q-T INTERVAL: 374 MS
EKG QRS DURATION: 104 MS
EKG QTC CALCULATION (BAZETT): 455 MS
EKG R AXIS: 57 DEGREES
EKG T AXIS: 96 DEGREES
EKG VENTRICULAR RATE: 89 BPM
EOSINOPHIL # BLD: 0.1 K/UL (ref 0–0.6)
EOSINOPHIL NFR BLD: 0.8 %
ETHANOLAMINE SERPL-MCNC: NORMAL MG/DL (ref 0–0.08)
FENTANYL SCREEN, URINE: ABNORMAL
GFR SERPLBLD CREATININE-BSD FMLA CKD-EPI: >60 ML/MIN/{1.73_M2}
GLUCOSE SERPL-MCNC: 118 MG/DL (ref 70–99)
GLUCOSE UR STRIP.AUTO-MCNC: NEGATIVE MG/DL
HCT VFR BLD AUTO: 50 % (ref 40.5–52.5)
HGB BLD-MCNC: 16.6 G/DL (ref 13.5–17.5)
HGB UR QL STRIP.AUTO: NEGATIVE
KETONES UR STRIP.AUTO-MCNC: NEGATIVE MG/DL
LEUKOCYTE ESTERASE UR QL STRIP.AUTO: NEGATIVE
LYMPHOCYTES # BLD: 2.3 K/UL (ref 1–5.1)
LYMPHOCYTES NFR BLD: 31.9 %
MCH RBC QN AUTO: 29.4 PG (ref 26–34)
MCHC RBC AUTO-ENTMCNC: 33.2 G/DL (ref 31–36)
MCV RBC AUTO: 88.6 FL (ref 80–100)
METHADONE UR QL SCN>300 NG/ML: ABNORMAL
MONOCYTES # BLD: 0.5 K/UL (ref 0–1.3)
MONOCYTES NFR BLD: 6.9 %
NEUTROPHILS # BLD: 4.3 K/UL (ref 1.7–7.7)
NEUTROPHILS NFR BLD: 60.1 %
NITRITE UR QL STRIP.AUTO: NEGATIVE
OPIATES UR QL SCN>300 NG/ML: ABNORMAL
OXYCODONE UR QL SCN: ABNORMAL
PCP UR QL SCN>25 NG/ML: ABNORMAL
PH UR STRIP.AUTO: 6 [PH] (ref 5–8)
PH UR STRIP: 6 [PH]
PLATELET # BLD AUTO: 286 K/UL (ref 135–450)
PMV BLD AUTO: 9 FL (ref 5–10.5)
POTASSIUM SERPL-SCNC: 4.2 MMOL/L (ref 3.5–5.1)
PROT UR STRIP.AUTO-MCNC: NEGATIVE MG/DL
RBC # BLD AUTO: 5.64 M/UL (ref 4.2–5.9)
SALICYLATES SERPL-MCNC: 0.6 MG/DL (ref 15–30)
SODIUM SERPL-SCNC: 140 MMOL/L (ref 136–145)
SP GR UR STRIP.AUTO: 1.02 (ref 1–1.03)
TSH SERPL DL<=0.005 MIU/L-ACNC: 2.19 UIU/ML (ref 0.27–4.2)
UA COMPLETE W REFLEX CULTURE PNL UR: NORMAL
UA DIPSTICK W REFLEX MICRO PNL UR: NORMAL
URN SPEC COLLECT METH UR: NORMAL
UROBILINOGEN UR STRIP-ACNC: 0.2 E.U./DL
WBC # BLD AUTO: 7.2 K/UL (ref 4–11)

## 2023-10-23 PROCEDURE — 36415 COLL VENOUS BLD VENIPUNCTURE: CPT

## 2023-10-23 PROCEDURE — 80307 DRUG TEST PRSMV CHEM ANLYZR: CPT

## 2023-10-23 PROCEDURE — 80179 DRUG ASSAY SALICYLATE: CPT

## 2023-10-23 PROCEDURE — 93010 ELECTROCARDIOGRAM REPORT: CPT | Performed by: INTERNAL MEDICINE

## 2023-10-23 PROCEDURE — 80048 BASIC METABOLIC PNL TOTAL CA: CPT

## 2023-10-23 PROCEDURE — 85025 COMPLETE CBC W/AUTO DIFF WBC: CPT

## 2023-10-23 PROCEDURE — 1240000000 HC EMOTIONAL WELLNESS R&B

## 2023-10-23 PROCEDURE — 80143 DRUG ASSAY ACETAMINOPHEN: CPT

## 2023-10-23 PROCEDURE — 82077 ASSAY SPEC XCP UR&BREATH IA: CPT

## 2023-10-23 PROCEDURE — 80061 LIPID PANEL: CPT

## 2023-10-23 PROCEDURE — 6370000000 HC RX 637 (ALT 250 FOR IP): Performed by: NURSE PRACTITIONER

## 2023-10-23 PROCEDURE — 84443 ASSAY THYROID STIM HORMONE: CPT

## 2023-10-23 PROCEDURE — 83036 HEMOGLOBIN GLYCOSYLATED A1C: CPT

## 2023-10-23 PROCEDURE — 81003 URINALYSIS AUTO W/O SCOPE: CPT

## 2023-10-23 PROCEDURE — 99285 EMERGENCY DEPT VISIT HI MDM: CPT

## 2023-10-23 PROCEDURE — 93005 ELECTROCARDIOGRAM TRACING: CPT | Performed by: STUDENT IN AN ORGANIZED HEALTH CARE EDUCATION/TRAINING PROGRAM

## 2023-10-23 RX ORDER — NICOTINE 21 MG/24HR
1 PATCH, TRANSDERMAL 24 HOURS TRANSDERMAL DAILY
Status: DISCONTINUED | OUTPATIENT
Start: 2023-10-23 | End: 2023-10-26 | Stop reason: HOSPADM

## 2023-10-23 RX ORDER — BENZTROPINE MESYLATE 1 MG/ML
2 INJECTION INTRAMUSCULAR; INTRAVENOUS 2 TIMES DAILY PRN
Status: DISCONTINUED | OUTPATIENT
Start: 2023-10-23 | End: 2023-10-26 | Stop reason: HOSPADM

## 2023-10-23 RX ORDER — POLYETHYLENE GLYCOL 3350 17 G
2 POWDER IN PACKET (EA) ORAL
Status: DISCONTINUED | OUTPATIENT
Start: 2023-10-23 | End: 2023-10-26 | Stop reason: HOSPADM

## 2023-10-23 RX ORDER — IBUPROFEN 400 MG/1
400 TABLET ORAL EVERY 6 HOURS PRN
Status: DISCONTINUED | OUTPATIENT
Start: 2023-10-23 | End: 2023-10-26

## 2023-10-23 RX ORDER — ACETAMINOPHEN 325 MG/1
650 TABLET ORAL EVERY 4 HOURS PRN
Status: DISCONTINUED | OUTPATIENT
Start: 2023-10-23 | End: 2023-10-26

## 2023-10-23 RX ORDER — MAGNESIUM HYDROXIDE/ALUMINUM HYDROXICE/SIMETHICONE 120; 1200; 1200 MG/30ML; MG/30ML; MG/30ML
30 SUSPENSION ORAL EVERY 6 HOURS PRN
Status: DISCONTINUED | OUTPATIENT
Start: 2023-10-23 | End: 2023-10-26 | Stop reason: HOSPADM

## 2023-10-23 RX ORDER — OLANZAPINE 10 MG/1
10 TABLET ORAL EVERY 4 HOURS PRN
Status: DISCONTINUED | OUTPATIENT
Start: 2023-10-23 | End: 2023-10-26 | Stop reason: HOSPADM

## 2023-10-23 RX ADMIN — OLANZAPINE 10 MG: 10 TABLET, FILM COATED ORAL at 18:39

## 2023-10-23 ASSESSMENT — SLEEP AND FATIGUE QUESTIONNAIRES
AVERAGE NUMBER OF SLEEP HOURS: 3
DO YOU HAVE DIFFICULTY SLEEPING: YES
DO YOU USE A SLEEP AID: NO
SLEEP PATTERN: DIFFICULTY FALLING ASLEEP

## 2023-10-23 ASSESSMENT — PATIENT HEALTH QUESTIONNAIRE - PHQ9
SUM OF ALL RESPONSES TO PHQ QUESTIONS 1-9: 0
SUM OF ALL RESPONSES TO PHQ QUESTIONS 1-9: 0
1. LITTLE INTEREST OR PLEASURE IN DOING THINGS: 0
2. FEELING DOWN, DEPRESSED OR HOPELESS: 0
SUM OF ALL RESPONSES TO PHQ QUESTIONS 1-9: 0
SUM OF ALL RESPONSES TO PHQ9 QUESTIONS 1 & 2: 0
SUM OF ALL RESPONSES TO PHQ QUESTIONS 1-9: 0

## 2023-10-23 ASSESSMENT — PAIN - FUNCTIONAL ASSESSMENT: PAIN_FUNCTIONAL_ASSESSMENT: 0-10

## 2023-10-23 ASSESSMENT — PAIN SCALES - GENERAL
PAINLEVEL_OUTOF10: 0

## 2023-10-23 ASSESSMENT — LIFESTYLE VARIABLES
HOW OFTEN DO YOU HAVE A DRINK CONTAINING ALCOHOL: PATIENT DECLINED
HOW MANY STANDARD DRINKS CONTAINING ALCOHOL DO YOU HAVE ON A TYPICAL DAY: PATIENT DECLINED

## 2023-10-23 NOTE — ED NOTES
Level of Care Disposition: Admit       Patient was seen by ED provider and SW/Nursing staff. The case presented to psychiatric provider on-call Hugo Sullivan, [address] [facility]. Based on the ED evaluation and information presented to the provider by Magda Manning it is the recommendation of the on call psychiatric provider that inpatient hospitalization is the least restrictive environment for the patient at this time. The patient will be admitted to the inpatient unit.            Insurance Pre certification Authorization: [address] [place] [address], [address], [address] [facility]  10/23/23 6202

## 2023-10-23 NOTE — ED NOTES
Collateral Contact:  Name: Joe Lewis  Phone: [phone]  Relation to Patient: Mother  Last Contact with Patient: Joe Lewis transported pt to ED  Concerns: Joe Lewis reported that she is concerned with pt's drug use. Joe Lewis reports that he is \"talking crazy\" and speaking about the \"big picture\". Joe Lewis reports that he has been acting this way for 2-3 weeks. Joe Lewis reports he believes that others can hear his thoughts and that he is God at times. Joe Lewis reported that pt will run out of the room talking about how he figured out the big picture. Joe Lewis reports that he has been on/off sober and has been to rehab multiple times. Joe Lewis is concerned with pt's wellbeing and concerned that he will impulsively harm himself due to the hallucinations.          Joe Lewis [facility]  10/23/23 9703

## 2023-10-23 NOTE — ED NOTES
Pt straight stuck for blood work to [facility], pt tolerated well. Pt changed into safety gown, belongings documented and placed at nurses station.       Jorge Alberto Rai RN  10/23/23 4971

## 2023-10-23 NOTE — ED NOTES
Presenting Problem:Patient presents to the ED voluntarily by his  mother after feeling that drones were watching him and others could hear his thoughts. Appearance/Hygiene:  well-appearing, hospital attire, seated in bed, fair grooming, and fair hygiene   Motor Behavior: WNL   Attitude: cooperative  Affect: anxious   Speech: normal pitch and normal volume, disorganized  Mood: anxious and irritable   Thought Processes: Obsessive and Illogical  Perceptions: Present - Thought broadcasting: pt reports that others can hear his thoughts/read his thoughts  Command Hallucinations: pt reports that he hears voices telling him to do things, reports that at one point they told him to jump off the bridge he was working on but then God came back and told him to sit down. Thought content:  future oriented,  delusions,  paranoid     Orientation: A&Ox4   Memory: intact  Concentration: Fair    Insight/ judgement: impaired judgment, impaired insight, delusions    Psychosocial and contextual factors: Pt is a 26year old male who currently lives alone in a trailer. He reports that he has friends that stay with him periodically. Pt reports that he has two daughters who live with their mom. Pt reports that he wants to be sober to be able to make sure they're okay. C-SSRS flowsheet is  Complete. Psychiatric History (including current outpatient provider and past inpatient admissions): Pt reports hx of depression/anxiety. Pt reports that he has a history of meth and heroin use. Pt reports that he currently uses meth daily and his last use was 10/22/23. He has been to rehab multiple times. Pt denies taking medication outside of suboxone. Pt presents with disorganized speech and is hard to follow. Pt refers to the \"big picture\" numerous times and discussed his fears for dying and going to nursing home.  Pt reports auditory hallucinations and reports that he hears voices that help him on his quest. Pt reports that the voices have told him to

## 2023-10-24 PROBLEM — F11.20 OPIOID USE DISORDER, SEVERE, DEPENDENCE (HCC): Status: ACTIVE | Noted: 2023-10-24

## 2023-10-24 PROBLEM — R03.0 ELEVATED BLOOD PRESSURE READING: Status: ACTIVE | Noted: 2023-10-24

## 2023-10-24 PROBLEM — F15.20 METHAMPHETAMINE USE DISORDER, SEVERE, DEPENDENCE (HCC): Status: ACTIVE | Noted: 2023-10-24

## 2023-10-24 PROBLEM — F14.20 COCAINE USE DISORDER, SEVERE, DEPENDENCE (HCC): Status: ACTIVE | Noted: 2023-10-24

## 2023-10-24 PROBLEM — F19.10 POLYSUBSTANCE ABUSE (HCC): Status: ACTIVE | Noted: 2023-10-24

## 2023-10-24 LAB
CHOLEST SERPL-MCNC: 197 MG/DL (ref 0–199)
HDLC SERPL-MCNC: 41 MG/DL (ref 40–60)
LDLC SERPL CALC-MCNC: 133 MG/DL
TRIGL SERPL-MCNC: 116 MG/DL (ref 0–150)
VLDLC SERPL CALC-MCNC: 23 MG/DL

## 2023-10-24 PROCEDURE — 99221 1ST HOSP IP/OBS SF/LOW 40: CPT | Performed by: NURSE PRACTITIONER

## 2023-10-24 PROCEDURE — 1240000000 HC EMOTIONAL WELLNESS R&B

## 2023-10-24 PROCEDURE — 6370000000 HC RX 637 (ALT 250 FOR IP): Performed by: PSYCHIATRY & NEUROLOGY

## 2023-10-24 PROCEDURE — 99223 1ST HOSP IP/OBS HIGH 75: CPT | Performed by: PSYCHIATRY & NEUROLOGY

## 2023-10-24 RX ORDER — LORAZEPAM 2 MG/1
2 TABLET ORAL ONCE
Status: COMPLETED | OUTPATIENT
Start: 2023-10-24 | End: 2023-10-24

## 2023-10-24 RX ORDER — OLANZAPINE 10 MG/1
10 TABLET ORAL ONCE
Status: COMPLETED | OUTPATIENT
Start: 2023-10-24 | End: 2023-10-24

## 2023-10-24 RX ADMIN — OLANZAPINE 10 MG: 10 TABLET, FILM COATED ORAL at 12:00

## 2023-10-24 RX ADMIN — LORAZEPAM 2 MG: 2 TABLET ORAL at 12:00

## 2023-10-24 ASSESSMENT — PATIENT HEALTH QUESTIONNAIRE - PHQ9
2. FEELING DOWN, DEPRESSED OR HOPELESS: 0
1. LITTLE INTEREST OR PLEASURE IN DOING THINGS: 0
SUM OF ALL RESPONSES TO PHQ QUESTIONS 1-9: 0
SUM OF ALL RESPONSES TO PHQ QUESTIONS 1-9: 0
SUM OF ALL RESPONSES TO PHQ9 QUESTIONS 1 & 2: 0
SUM OF ALL RESPONSES TO PHQ QUESTIONS 1-9: 0
SUM OF ALL RESPONSES TO PHQ QUESTIONS 1-9: 0

## 2023-10-24 ASSESSMENT — PAIN SCALES - GENERAL: PAINLEVEL_OUTOF10: 0

## 2023-10-24 ASSESSMENT — SLEEP AND FATIGUE QUESTIONNAIRES
DO YOU HAVE DIFFICULTY SLEEPING: YES
DO YOU USE A SLEEP AID: NO
SLEEP PATTERN: DIFFICULTY FALLING ASLEEP;RESTLESSNESS;DISTURBED/INTERRUPTED SLEEP
AVERAGE NUMBER OF SLEEP HOURS: 3

## 2023-10-24 NOTE — H&P
Hospital Medicine History & Physical      PCP: No primary care provider on file. Date of Admission: 10/23/2023    Date of Service: Pt seen/examined on 10/24/23      Chief Complaint:    Chief Complaint   Patient presents with    Hallucinations    Paranoid     Pt brought in by mother. Pt reports that people can see and hear his thoughts. Pt also reporting visual hallucinations. Drone are following him. Pt reports that he wants help; he also uses meth regularly, last use yesterday. Pt reports that voices are telling him to kill himself, however pt doesn't want to hurt himself or others. History Of Present Illness: The patient is a 26 y.o. male with with PMH of hepatitis C, polysubstance abuse who presented to [facility] for visual hallucinations. Patient was seen and evaluated in the ED by the ED medical provider, patient was medically cleared for admission to [facility] at [facility]. This note serves as an admission medical H&P. Tobacco use: vapes  ETOH use: denies current   Illicit drug use: yes, unable to verbalize how often +UDS    Patient denies any medical complaints     Past Medical History:        Diagnosis Date    Hepatitis C 11/17/2020    Heroin abuse ([facility])     Methamphetamine abuse ([facility])        Past Surgical History:    History reviewed. No pertinent surgical history. Medications Prior to Admission:    Prior to Admission medications    Medication Sig Start Date End Date Taking? Authorizing Provider   ketorolac (TORADOL) 10 MG tablet Take 1 tablet by mouth every 6 hours as needed for Pain  Patient not taking: Reported on 5/15/2023 12/13/22 12/13/23  ELAINE Lopez       Allergies:  Patient has no known allergies. Social History:  The patient currently lives home    TOBACCO:   reports that he has quit smoking. His smoking use included cigarettes. He smoked an average of 1 pack per day. His smokeless tobacco use includes chew. ETOH:   reports no history of alcohol use.       Family History:
Behavior/Attitude toward examiner: suspicious  Speech:  spontaneous, normal rate  Mood:  \"terrible\"  Affect:  Mood congruent. Tearful at times. Thought processes:  disorganized  Thought Content: no SI, no HI, + delusional. +paranoid  Perceptions: + AH, preoccupied   Attention: impaired  Abstraction: impaired  Cognition: Average LATOYA, Alert and oriented to person, place, time, and situation, recall intact  Insight: impaired   Judgment: impaired     LAB: Reviewed all labs obtained during admission to date. Formulation: domiciled, , and unemployed 29yo with a history of substance use whose mother brought him to our ED with worsening psychotic symptoms. Dx:   Primary Psychiatric (DSM V) Diagnosis: psychosis, unspecified  Secondary Psychiatric (DSM V) Diagnoses: history of depression and ADHD per patient. Chemical Dependency Diagnoses: methamphetamine, cocaine, and opiate use disorders    Plan:  1. Admit to psychiatry for stabilization and treatment. 2. On admission, order q15min checks for safety, programming, and prn medication for anxiety, agitation, insomnia. Give one time doses of ativan and olanzapine for agitation. 3. Hospitalist consult for admission. 4. Collateral colleted from mother in the ED. 5. ELOS 5-8. Admitted on a Statement of Belief. Place on a hold to facilitate this evaluation. Spent > 75 minutes face to face with patient of which >50% was spent counseling and providing education regarding diagnosis, treatment options, and prognosis.     Gmea Anderson MD  Staff Psychiatrist

## 2023-10-24 NOTE — CARE COORDINATION
SW met w/the Pt at bedside to complete their psychosocial assessment and lifetime CSSR-S. The Pt was withdrawn and guarded but cooperated in answering the assessment questions. The Pt oscillated between being irritable and tearful during the interview. The Pt was confused on why they were here and what they are supposed to do here. The Pt denied any active SI or attempts. The Pt endorsed passive SI in the past.      10/24/23 0928   Psychiatric History   Psychiatric history treatment   (Denies admission hx)   Contact information Pt does online services for his suboxone   Are there any medication issues? No   Recent Psychological Experiences Turmoil (comment)  (Pt reports being unsure why he is here.  Per ED notes Pt had significant drug induced psychosis)   Support System   Support system Adequate   Types of Support System Mother;Father;Brother;Friend   Problems in support system None  (Pt reports that they have a strong support system as long as they utilize it.)   Current Living Situation   Home Living Adequate   Living information Lives alone   Problems with living situation  No   Lack of basic needs No   SSDI/SSI N/A   Other government assistance N/A   Problems with environment N/A   Current abuse issues Denies   Supervised setting None   Relationship problems Yes   Relationship problems due to  Divorce/Separation  (Pt reports being  for 2 years)   Medical and Self-Care Issues   Relevant medical problems N/A   Relevant self-care issues N/A   Barriers to treatment Yes  (Finances and motivation)   Family [place]   Spouse/partner-name/age    Children-names/ages 2 daughters   Parents Alex Hernandez and Christine Foil   Siblings 2 brothers   Support services   (Pt see's an agency online but is unsure of the name)   Childhood   Raised by Biological mother;Biological father   Biological mother Mk Moya father Houston Loss   Relevant family history Denies   History of abuse No   Legal History

## 2023-10-25 LAB
ANION GAP SERPL CALCULATED.3IONS-SCNC: 13 MMOL/L (ref 3–16)
BUN SERPL-MCNC: 21 MG/DL (ref 7–20)
CALCIUM SERPL-MCNC: 10.3 MG/DL (ref 8.3–10.6)
CHLORIDE SERPL-SCNC: 100 MMOL/L (ref 99–110)
CO2 SERPL-SCNC: 27 MMOL/L (ref 21–32)
CREAT SERPL-MCNC: 1.2 MG/DL (ref 0.9–1.3)
EST. AVERAGE GLUCOSE BLD GHB EST-MCNC: 111.2 MG/DL
GFR SERPLBLD CREATININE-BSD FMLA CKD-EPI: >60 ML/MIN/{1.73_M2}
GLUCOSE SERPL-MCNC: 136 MG/DL (ref 70–99)
HBA1C MFR BLD: 5.5 %
POTASSIUM SERPL-SCNC: 4.1 MMOL/L (ref 3.5–5.1)
SODIUM SERPL-SCNC: 140 MMOL/L (ref 136–145)

## 2023-10-25 PROCEDURE — 36415 COLL VENOUS BLD VENIPUNCTURE: CPT

## 2023-10-25 PROCEDURE — 80048 BASIC METABOLIC PNL TOTAL CA: CPT

## 2023-10-25 PROCEDURE — 6370000000 HC RX 637 (ALT 250 FOR IP): Performed by: NURSE PRACTITIONER

## 2023-10-25 PROCEDURE — 99233 SBSQ HOSP IP/OBS HIGH 50: CPT | Performed by: PSYCHIATRY & NEUROLOGY

## 2023-10-25 PROCEDURE — 1240000000 HC EMOTIONAL WELLNESS R&B

## 2023-10-25 RX ADMIN — OLANZAPINE 10 MG: 10 TABLET, FILM COATED ORAL at 15:53

## 2023-10-25 RX ADMIN — NICOTINE POLACRILEX 2 MG: 2 LOZENGE ORAL at 15:50

## 2023-10-25 NOTE — DISCHARGE INSTRUCTIONS
Advanced Directives:  Patient does not have a surrogate decision maker appointed   Name (if yes): N/A Phone Number: N/A  Patient does not have a psychiatric and/ or medical advanced directive or power of . Patient was offered psychiatric and/ or medical advanced directive or power of  information/completion but declined to complete   Why not? N/A    Discharge Planning is Complete. Discharge Date: 10/26/23  Reason for Hospitalization: Domiciled, , and unemployed 27yo with a history of substance use whose mother brought him to our ED with worsening psychotic symptoms. Discharge Diagnosis: Psychosis, unspecified psychosis type [facility])  Discharging to: Home    Your instructions: Your physician here was Alex Orr MD. If you have any questions please call the unit at [phone] for the adult unit or [phone] for [facility]. Please note that we have a patient family advisory [place] that meets the second Wednesday of January and the second Wednesday of July at [address] [place] [address] in [place] at [facility]. Department leadership would love for you to attend to give feedback on what we are doing well and areas in which we can improve our patient care. Please come if you are able and feel free to reach out to [address] [place] [address] at [phone] with any questions. The crisis number for [facility] is [phone] (SAVE). This crisis line is available 24 hours a day, seven days a week. Please follow up with your PCP regarding any pending labs. You are being referred to [facility]. They provide case management, medication management, and mental health therapy. See below.    Name of Provider: [address] [place] [address]  Provider specialty/license: mental health services  Date and time of appointment: 10/31/23 at 9:00 AM <Suggested Walk-in time   The type/s of services requested are: counseling/medication management  Agency name: [facility]

## 2023-10-26 VITALS
DIASTOLIC BLOOD PRESSURE: 106 MMHG | SYSTOLIC BLOOD PRESSURE: 147 MMHG | OXYGEN SATURATION: 98 % | TEMPERATURE: 97.7 F | HEIGHT: 73 IN | BODY MASS INDEX: 28.39 KG/M2 | WEIGHT: 214.21 LBS | RESPIRATION RATE: 18 BRPM | HEART RATE: 88 BPM

## 2023-10-26 PROCEDURE — 5130000000 HC BRIDGE APPOINTMENT

## 2023-10-26 PROCEDURE — 6370000000 HC RX 637 (ALT 250 FOR IP): Performed by: NURSE PRACTITIONER

## 2023-10-26 RX ORDER — ACETAMINOPHEN 325 MG/1
650 TABLET ORAL EVERY 8 HOURS PRN
Status: DISCONTINUED | OUTPATIENT
Start: 2023-10-26 | End: 2023-10-26 | Stop reason: HOSPADM

## 2023-10-26 RX ORDER — PALIPERIDONE 3 MG/1
3 TABLET, EXTENDED RELEASE ORAL DAILY
Status: DISCONTINUED | OUTPATIENT
Start: 2023-10-26 | End: 2023-10-26 | Stop reason: HOSPADM

## 2023-10-26 RX ORDER — PALIPERIDONE 3 MG/1
6 TABLET, EXTENDED RELEASE ORAL DAILY
Status: DISCONTINUED | OUTPATIENT
Start: 2023-10-27 | End: 2023-10-26 | Stop reason: HOSPADM

## 2023-10-26 RX ORDER — LORAZEPAM 2 MG/1
2 TABLET ORAL ONCE
Status: DISCONTINUED | OUTPATIENT
Start: 2023-10-26 | End: 2023-10-26 | Stop reason: HOSPADM

## 2023-10-26 NOTE — PROGRESS NOTES
4 Eyes Skin Assessment     NAME:  Rahel Sheppard  YOB: 1990  MEDICAL RECORD NUMBER:  [id number]    The patient is being assessed for  Admission    I agree that at least one RN has performed a thorough Head to Toe Skin Assessment on the patient. ALL assessment sites listed below have been assessed. Areas assessed by both nurses:    Head, Face, Ears and Legs. Feet and Heels    Pt refused four eyes he is too anxious/agitated. Did look at visible areas and no skin issues noted. Does the Patient have a Wound?  No noted wound(s)       Antelmo Prevention initiated by RN: No  Wound Care Orders initiated by RN: No    Pressure Injury (Stage 3,4, Unstageable, DTI, NWPT, and Complex wounds) if present, place Wound referral order by RN under : No    New Ostomies, if present place, Ostomy referral order under : No     Nurse 1 eSignature: Electronically signed by Arthur Arellano RN on 10/24/23 at 4:49 PM EDT    **SHARE this note so that the co-signing nurse can place an eSignature**    Nurse 2 eSignature: {Esignature:[id number]}
[facility]  Admission Note     Admission Type:   Admission Type: Involuntary    Reason for admission:  Reason for Admission: psychotic behavior      Addictive Behavior:   Addictive Behavior  In the Past 3 Months, Have You Felt or Has Someone Told You That You Have a Problem With  : Other (comment) (unable to assess due to psychotic behavior)    Medical Problems:   Past Medical History:   Diagnosis Date    Hepatitis C 11/17/2020    Heroin abuse ([address])     Methamphetamine abuse ([address])        Status EXAM:  Mental Status and Behavioral Exam  Normal: No  Level of Assistance: Independent/Self  Facial Expression: Avoids Gaze, Euphoric, Worried, Flat  Affect: Inappropriate, Blunt, Unstable  Level of Consciousness: Confused  Frequency of Checks: 4 times per hour, close  Mood:Normal: No  Mood: Anxious, Labile, Irritable, Euphoric, Suspicious, Angry  Motor Activity:Normal: No  Motor Activity: Decreased  Eye Contact: Poor  Observed Behavior: Agitated, Impulsive, Cooperative, Preoccupied  Sexual Misconduct History: Current - no  Preception: [place] to person  Attention:Normal: No  Attention: Distractible, Hyperalert, Unable to concentrate  Thought Processes: Blocking, Loose association, Flight of ideas, Tangential  Thought Content:Normal: No  Thought Content: Delusions, Ideas of influence, Obsessions, Paranoia  Depression Symptoms: No problems reported or observed. Anxiety Symptoms: Generalized  Lupe Symptoms: No problems reported or observed. Hallucinations:  Auditory (comment), Visual (comment)  Delusions: Yes  Delusions: Paranoid, Persecutory  Memory:Normal: No  Memory: Poor remote, Poor recent, Confabulation  Insight and Judgment: No  Insight and Judgment: Poor judgment, Poor insight, Unrealistic    Tobacco Screening:  Practical Counseling, on admission, alex X, if applicable and completed (first 3 are required if patient doesn't refuse):            ( ) Recognizing danger situations (included triggers and
At 1810 came-in from [address]. Pt was only oriented to self and place. He was observed anxious with psychotic symptoms such as AVH, Delusional thought believing that people outside needs his help and he stated that he is not safe because drone are all around looking for him. Noted with Loose of association, ideas of reference and paranoia. Denies depression and SI without intent and plan. Cooperative somehow but during assessment pt was irrelevant to answer some queries. Took PRN zyprexa due to agitation and irritable mood. Settled on his bed afterwards. Unable to explained admission packet, to do the 4 eyes and to sign the consent forms due to psychotic behavior. Still for further observation.
Behavioral Services  Medicare Certification Upon Admission    I certify that this patient's inpatient psychiatric hospital admission is medically necessary for:    [x] (1) Treatment which could reasonably be expected to improve this patient's condition,       [x] (2) Or for diagnostic study;     AND     [x](2) The inpatient psychiatric services are provided while the individual is under the care of a physician and are included in the individualized plan of care.     Estimated length of stay/service 5-8 days    Plan for post-hospital care incomplete     Electronically signed by Smita Hobson MD on 10/24/2023 at 12:17 PM
Home Medication Reconciliation Status          [] COMPLETE       Medication history has been reviewed and obtained from the following source(s):       [] patient/family verbal report             [] patient/family provided written list       [] external pharmacy   [] external facility list         []  Provider notified that home medication reconciliation is complete          [x] IN PROGRESS       Medication reconciliation marked in progress at this time due to:       [x] patient/family poor historian      [x] waiting arrival of family to clarify       [x] waiting for accurate list        [x] external pharmacy needs called      * Follow up is needed. [] UNABLE TO ASSESS       Medication reconciliation is incomplete and unable to assess at this time due to:       [] critical patient condition   [] patient is unresponsive        [] no family available                       [] unknown pharmacy       [] anonymous patient          * Follow up is needed.       [] Pharmacy consult placed for medication reconciliation assistance   Additional comments:
Pt has been withdrawn to room this shift. Introduced myself and patient was anxious and irritable asking why I need to introduce myself. Attempt to go over paperwork and admission information with patient and he is unable to retain information at this time and is unable to concentrate on topic. He becomes anxious and states he is scared and \"trying to figure out the bigger picture\" pt has disorganized thoughts and unable to follow conversation. New order for ativan and zyprexa X1 and pt was cooperative with this. Pt remains cooperative at this time. He has ate breakfast and lunch.
Pt is in bed appears to be sleeping with eyes closed r/r e/e. No s/s of distress noted.
Pt seen drowsy, took vitals then prefers to go back again to sleep. Slept most of the time during the shift.
hospital problems. *     Plan:  1. Admit to psychiatry for stabilization and treatment. 2. On admission, order q15min checks for safety, programming, and prn medication for anxiety, agitation, insomnia. Give one time doses of ativan and olanzapine for agitation. 10/25/2023 - Less agitated, but still psychotic. \ Continues to talk about \"the larger picture\" and his history being erased. Disorganized, delusional, and impaired. 3. Hospitalist consult for admission. Hypercalcemia - resolved. - noted on admission labs  - 10.8  - repeat WNL     Elevated Blood Pressure  - noted on admission, no previous history  - likely 2/2 polysubstance abuse  - monitor and if needed will start antihypertensive      Hx of hepatitis C  - pt stated he follows with GI     4. Collateral colleted from mother in the ED. 5. ELOS 5-8. Admitted on a Statement of Belief. Placed on a hold to facilitate this evaluation. Total time with patient was 50 minutes and more than 50 % of that time was spent counseling the patient on their symptoms, treatment, and expected goals.      Joseline Ayoub MD

## 2023-10-26 NOTE — PLAN OF CARE
[facility]  Treatment Team Note  Review Date & Time: 10/26/23  0930    Patient was not in treatment team      Status EXAM:   Mental Status and Behavioral Exam  Normal: No  Level of Assistance: Independent/Self  Facial Expression: Worried  Affect: Blunt  Level of Consciousness: Confused  Frequency of Checks: 4 times per hour, close  Mood:Normal: No  Mood: Anxious  Motor Activity:Normal: No  Motor Activity: Decreased  Eye Contact: Fair  Observed Behavior: Guarded, Withdrawn, Cooperative  Sexual Misconduct History: Current - no  Preception: [place] to person, [place] to time, [place] to place  Attention:Normal: No  Attention: Distractible  Thought Processes: Tangential  Thought Content:Normal: No  Thought Content: Delusions, Paranoia  Depression Symptoms: Impaired concentration, Isolative, Loss of interest  Anxiety Symptoms: Generalized  Lupe Symptoms: Labile  Hallucinations: Unable to assess  Delusions: Yes  Delusions: Paranoid, Persecutory  Memory:Normal: No  Memory: Poor recent, Poor remote  Insight and Judgment: No  Insight and Judgment: Poor judgment, Poor insight      Suicide Risk CSSR-S:  1) Within the past month, have you wished you were dead or wished you could go to sleep and not wake up? : No  2) Have you actually had any thoughts of killing yourself? : No  6) Have you ever done anything, started to do anything, or prepared to do anything to end your life?: No      PLAN/TREATMENT RECOMMENDATIONS UPDATE:   Patient will take medication as prescribed, eat 75% of meals, attend groups, participate in milieu activities, participate in treatment team and care planning for discharge and follow up.            Jearline Kussmaul, RN
Problem: Anxiety  Goal: Will report anxiety at manageable levels  10/25/2023 0518 by Love Davis RN  Outcome: Not Progressing  Flowsheets (Taken 10/24/2023 2030)  Will report anxiety at manageable levels:   Administer medication as ordered   Teach and rehearse alternative coping skills   Provide emotional support with 1:1 interaction with staff  10/24/2023 1644 by Floresita Piper RN  Outcome: Progressing     Problem: Confusion  Goal: Confusion, delirium, dementia, or psychosis is improved or at baseline  10/25/2023 0518 by Love Davis RN  Outcome: Not Progressing  Flowsheets (Taken 10/24/2023 2030)  Effect of thought disturbance (confusion, delirium, dementia, or psychosis) are managed with adequate functional status:   Monitor and intervene to maintain adequate nutrition, hydration, elimination, sleep and activity   Decrease environmental stimuli, including noise as appropriate  10/24/2023 1644 by Floresita Piper RN  Outcome: Progressing     Problem: Involuntary Admit  Goal: Will cooperate with staff recommendations and doctor's orders and will demonstrate appropriate behavior  Outcome: Not Progressing  Flowsheets (Taken 10/24/2023 2030)  Will cooperate with staff recommendations and doctor's orders and will demonstrate appropriate behavior:   Educate regarding involuntary admission procedures and rules   Treat underlying conditions and offer medication as ordered    Pt still on same behavior. Most of the time he was rested due to medication given to him. Still for further observation. Needs support and encouragement to participate in his plan of care.
Problem: Anxiety  Goal: Will report anxiety at manageable levels  Description: INTERVENTIONS:  1. Administer medication as ordered  2. Teach and rehearse alternative coping skills  3. Provide emotional support with 1:1 interaction with staff  10/25/2023 1625 by Aamir Benjamin LPN  Outcome: Not Progressing  10/25/2023 0518 by Elise Crow RN  Outcome: Not Progressing  Flowsheets (Taken 10/24/2023 2030)  Will report anxiety at manageable levels:   Administer medication as ordered   Teach and rehearse alternative coping skills   Provide emotional support with 1:1 interaction with staff     Problem: Decision Making  Goal: Pt/Family able to effectively weigh alternatives and participate in decision making related to treatment and care  Description: INTERVENTIONS:  1. Determine when there are differences between patient's view, family's view, and healthcare provider's view of condition  2. Facilitate patient and family articulation of goals for care  3. Help patient and family identify pros/cons of alternative solutions  4. Provide information as requested by patient/family  5. Respect patient/family right to receive or not to receive information  6. Serve as a liaison between patient and family and health care team  7. Initiate Consults from Ethics, Palliative Care or initiate [phone] as is appropriate  Outcome: Not Progressing     Problem: Confusion  Goal: Confusion, delirium, dementia, or psychosis is improved or at baseline  Description: INTERVENTIONS:  1. Assess for possible contributors to thought disturbance, including medications, impaired vision or hearing, underlying metabolic abnormalities, dehydration, psychiatric diagnoses, and notify attending LIP  2. [place] high risk fall precautions, as indicated  3. Provide frequent short contacts to provide reality reorientation, refocusing and direction  4. Decrease environmental stimuli, including noise as appropriate  5.  Monitor and intervene to
Problem: Pain  Goal: Verbalizes/displays adequate comfort level or baseline comfort level  Outcome: Not Progressing     Problem: Anxiety  Goal: Will report anxiety at manageable levels  Outcome: Not Progressing     Problem: Death & Dying  Goal: Pt/Family communicate acceptance of impending death and feel psychological comfort and peace  Outcome: Not Progressing     Problem: Decision Making  Goal: Pt/Family able to effectively weigh alternatives and participate in decision making related to treatment and care  Outcome: Not Progressing     Problem: Confusion  Goal: Confusion, delirium, dementia, or psychosis is improved or at baseline  Outcome: Not Progressing     Problem: Behavior  Goal: Pt/Family maintain appropriate behavior and adhere to behavioral management agreement, if implemented  Outcome: Not Progressing     Problem: Involuntary Admit  Goal: Will cooperate with staff recommendations and doctor's orders and will demonstrate appropriate behavior  Outcome: Not Progressing    Pt was newly admitted, as per behavior, pt noted with psychotic features, irritable and agitated. Irrelevantly responding to queries. Unable to explained the care plan due to his condition. Needs encouragement and reinforcement to participate in his plan of care. Still for further observation.
, Psychosocial CNS, Spiritual Care as appropriate  Outcome: Progressing
constant attention obtain sitter and review sitter guidelines with assigned personnel  7. Initiate Psychosocial CNS and Spiritual Care consult, as indicated  10/25/2023 1625 by Gianni Nj LPN  Outcome: Not Progressing     Problem: Behavior  Goal: Pt/Family maintain appropriate behavior and adhere to behavioral management agreement, if implemented  Description: INTERVENTIONS:  1. Assess patient/family's coping skills and  non-compliant behavior (including use of illegal substances)  2. Notify security of behavior or suspected illegal substances which indicate the need for search of the family and/or belongings  3. Encourage verbalization of thoughts and concerns in a socially appropriate manner  4. Utilize positive, consistent limit setting strategies supporting safety of patient, staff and others  5. Encourage participation in the decision making process about the behavioral management agreement  6. If a visitor's behavior poses a threat to safety call refer to organization policy. 7. Initiate consult with , Psychosocial CNS, Spiritual Care as appropriate  10/26/2023 0228 by Maria Guadalupe Stewart RN  Outcome: Not Progressing  10/25/2023 1625 by Gianni Nj LPN  Outcome: Progressing   Patient denies SI/HI, AVH and pain. Patient withdrawn to room. Cooperative but guarded.

## 2023-10-26 NOTE — BH NOTE
At discharge met with pt mother and reviewed discharge instructions with her. Pt reported \"I give these papers to her and she takes care of it\". Pleasant at discharge.
Handoff given to [facility] LPN at this time. Pt in stable condition and has no needs at this time.
Met with physician and had decided to stay for treatment than changed his mind and will be discharged. Pt called his mother who will pick him up.
Out to team station to retrieve morning meal. Good eye contact during interaction. Received a nicotine patch then returned to assigned room.
Pt gets up anxious, restless, with rambling speech, flight of ideas. BP high but difficult to take d/t restlessness. Pt medication given with prn of nicotine, prn given for anxiety and agitation.
Pt refusing lab draw at this time.
Pt resting in bed with eyes closed, no s/s of distress. Resp e/e. Pt not agreeable to vital signs nor assessment thus far into shift.
Up to the team station \"I want to sign myself out\". Inform his provider would be made aware. Anxious. \"It doesn't matter, I'm leaving today\".
(x ) Basic information about quitting (benefits of quitting, techniques in how to quit, available resources  ( ) Referral for counseling faxed to [phone]                                                                                                                   ( ) Patient refused counseling  ( ) Patient refused referral  ( ) Patient refused prescription upon discharge  ( ) Patient has not smoked in the last 30 days    Metabolic Screening:    Lab Results   Component Value Date    LABA1C 5.5 10/23/2023       Lab Results   Component Value Date    CHOL 197 10/23/2023     Lab Results   Component Value Date    TRIG 116 10/23/2023     Lab Results   Component Value Date    HDL 41 10/23/2023     No components found for: \"LDLCAL\"  Lab Results   Component Value Date    LABVLDL 23 10/23/2023       Sherrie Waite LPN

## 2023-10-27 ENCOUNTER — FOLLOWUP TELEPHONE ENCOUNTER (OUTPATIENT)
Dept: PSYCHIATRY | Age: 33
End: 2023-10-27

## 2025-09-02 ENCOUNTER — HOSPITAL ENCOUNTER (OUTPATIENT)
Dept: GENERAL RADIOLOGY | Age: 35
Discharge: HOME | End: 2025-09-02

## 2025-09-02 DIAGNOSIS — R76.11 NONSPECIFIC REACTION TO TUBERCULIN TEST: ICD-10-CM

## 2025-09-02 PROCEDURE — 71046 X-RAY EXAM CHEST 2 VIEWS: CPT
